# Patient Record
Sex: FEMALE | Race: WHITE | Employment: FULL TIME | URBAN - METROPOLITAN AREA
[De-identification: names, ages, dates, MRNs, and addresses within clinical notes are randomized per-mention and may not be internally consistent; named-entity substitution may affect disease eponyms.]

---

## 2018-05-29 ENCOUNTER — HOSPITAL ENCOUNTER (EMERGENCY)
Age: 38
Discharge: HOME | End: 2018-05-29
Payer: COMMERCIAL

## 2018-05-29 VITALS
BODY MASS INDEX: 34.04 KG/M2 | OXYGEN SATURATION: 96 % | WEIGHT: 185 LBS | SYSTOLIC BLOOD PRESSURE: 143 MMHG | DIASTOLIC BLOOD PRESSURE: 93 MMHG | HEIGHT: 62 IN | TEMPERATURE: 98.6 F | RESPIRATION RATE: 14 BRPM | HEART RATE: 84 BPM

## 2018-05-29 DIAGNOSIS — H01.00B BLEPHARITIS OF BOTH UPPER AND LOWER EYELID OF LEFT EYE, UNSPECIFIED TYPE: Primary | ICD-10-CM

## 2018-05-29 PROCEDURE — 99282 EMERGENCY DEPT VISIT SF MDM: CPT

## 2018-05-29 RX ORDER — POLYMYXIN B SULFATE AND TRIMETHOPRIM 1; 10000 MG/ML; [USP'U]/ML
1 SOLUTION OPHTHALMIC EVERY 4 HOURS
Qty: 1 BOTTLE | Refills: 0 | Status: SHIPPED | OUTPATIENT
Start: 2018-05-29 | End: 2018-06-05

## 2018-05-29 ASSESSMENT — ENCOUNTER SYMPTOMS
NAUSEA: 0
EYE DISCHARGE: 1
ABDOMINAL PAIN: 0
COUGH: 0
VOMITING: 0
EYE REDNESS: 1
SHORTNESS OF BREATH: 0
EYE ITCHING: 1
DIARRHEA: 0

## 2019-12-28 ENCOUNTER — APPOINTMENT (OUTPATIENT)
Dept: GENERAL RADIOLOGY | Age: 39
End: 2019-12-28
Payer: COMMERCIAL

## 2019-12-28 ENCOUNTER — HOSPITAL ENCOUNTER (EMERGENCY)
Age: 39
Discharge: HOME | End: 2019-12-28
Attending: EMERGENCY MEDICINE
Payer: COMMERCIAL

## 2019-12-28 VITALS
DIASTOLIC BLOOD PRESSURE: 77 MMHG | OXYGEN SATURATION: 95 % | SYSTOLIC BLOOD PRESSURE: 141 MMHG | RESPIRATION RATE: 20 BRPM | HEART RATE: 111 BPM | HEIGHT: 62 IN | TEMPERATURE: 99.2 F | WEIGHT: 180 LBS | BODY MASS INDEX: 33.13 KG/M2

## 2019-12-28 DIAGNOSIS — J40 BRONCHITIS: Primary | ICD-10-CM

## 2019-12-28 PROCEDURE — 71046 X-RAY EXAM CHEST 2 VIEWS: CPT

## 2019-12-28 PROCEDURE — 94640 AIRWAY INHALATION TREATMENT: CPT

## 2019-12-28 PROCEDURE — 6360000002 HC RX W HCPCS: Performed by: EMERGENCY MEDICINE

## 2019-12-28 PROCEDURE — 6370000000 HC RX 637 (ALT 250 FOR IP): Performed by: EMERGENCY MEDICINE

## 2019-12-28 PROCEDURE — 94761 N-INVAS EAR/PLS OXIMETRY MLT: CPT

## 2019-12-28 PROCEDURE — 99283 EMERGENCY DEPT VISIT LOW MDM: CPT

## 2019-12-28 RX ORDER — PREDNISONE 20 MG/1
60 TABLET ORAL ONCE
Status: COMPLETED | OUTPATIENT
Start: 2019-12-28 | End: 2019-12-28

## 2019-12-28 RX ORDER — DOXYCYCLINE HYCLATE 100 MG/1
100 CAPSULE ORAL ONCE
Status: COMPLETED | OUTPATIENT
Start: 2019-12-28 | End: 2019-12-28

## 2019-12-28 RX ORDER — DOXYCYCLINE 100 MG/1
100 TABLET ORAL 2 TIMES DAILY
Qty: 14 TABLET | Refills: 0 | Status: SHIPPED | OUTPATIENT
Start: 2019-12-28 | End: 2020-01-04

## 2019-12-28 RX ORDER — PREDNISONE 20 MG/1
40 TABLET ORAL DAILY
Qty: 10 TABLET | Refills: 0 | Status: SHIPPED | OUTPATIENT
Start: 2019-12-28 | End: 2020-01-02

## 2019-12-28 RX ORDER — LEVALBUTEROL 1.25 MG/.5ML
SOLUTION, CONCENTRATE RESPIRATORY (INHALATION)
Status: DISCONTINUED
Start: 2019-12-28 | End: 2019-12-28 | Stop reason: HOSPADM

## 2019-12-28 RX ORDER — IPRATROPIUM BROMIDE AND ALBUTEROL SULFATE 2.5; .5 MG/3ML; MG/3ML
1 SOLUTION RESPIRATORY (INHALATION) PRN
Status: DISCONTINUED | OUTPATIENT
Start: 2019-12-28 | End: 2019-12-28 | Stop reason: HOSPADM

## 2019-12-28 RX ORDER — LEVALBUTEROL 1.25 MG/.5ML
1.25 SOLUTION, CONCENTRATE RESPIRATORY (INHALATION) PRN
Status: DISCONTINUED | OUTPATIENT
Start: 2019-12-28 | End: 2019-12-28 | Stop reason: HOSPADM

## 2019-12-28 RX ADMIN — IPRATROPIUM BROMIDE AND ALBUTEROL SULFATE 1 AMPULE: .5; 3 SOLUTION RESPIRATORY (INHALATION) at 15:14

## 2019-12-28 RX ADMIN — PREDNISONE 60 MG: 20 TABLET ORAL at 16:11

## 2019-12-28 RX ADMIN — LEVALBUTEROL 1.25 MG: 1.25 SOLUTION, CONCENTRATE RESPIRATORY (INHALATION) at 15:37

## 2019-12-28 RX ADMIN — DOXYCYCLINE HYCLATE 100 MG: 100 CAPSULE ORAL at 16:11

## 2019-12-28 RX ADMIN — LEVALBUTEROL 1.25 MG: 1.25 SOLUTION, CONCENTRATE RESPIRATORY (INHALATION) at 15:30

## 2019-12-28 ASSESSMENT — PAIN DESCRIPTION - ORIENTATION: ORIENTATION: RIGHT;LEFT

## 2019-12-28 ASSESSMENT — ENCOUNTER SYMPTOMS
CHEST TIGHTNESS: 1
SORE THROAT: 0
WHEEZING: 1
VOMITING: 0
SHORTNESS OF BREATH: 1
COUGH: 1
ABDOMINAL PAIN: 0
EYE PAIN: 0
NAUSEA: 0

## 2019-12-28 ASSESSMENT — PAIN DESCRIPTION - ONSET: ONSET: ON-GOING

## 2019-12-28 ASSESSMENT — PAIN DESCRIPTION - FREQUENCY: FREQUENCY: INTERMITTENT

## 2019-12-28 ASSESSMENT — PAIN DESCRIPTION - PROGRESSION: CLINICAL_PROGRESSION: GRADUALLY WORSENING

## 2019-12-28 ASSESSMENT — PAIN SCALES - GENERAL: PAINLEVEL_OUTOF10: 10

## 2019-12-28 ASSESSMENT — PAIN DESCRIPTION - DESCRIPTORS: DESCRIPTORS: ACHING

## 2019-12-28 ASSESSMENT — PAIN DESCRIPTION - PAIN TYPE: TYPE: ACUTE PAIN

## 2019-12-28 ASSESSMENT — PAIN DESCRIPTION - LOCATION: LOCATION: THROAT

## 2022-08-09 ENCOUNTER — OFFICE VISIT (OUTPATIENT)
Dept: FAMILY MEDICINE CLINIC | Age: 42
End: 2022-08-09
Payer: COMMERCIAL

## 2022-08-09 VITALS
WEIGHT: 236.6 LBS | HEIGHT: 62 IN | DIASTOLIC BLOOD PRESSURE: 70 MMHG | SYSTOLIC BLOOD PRESSURE: 128 MMHG | HEART RATE: 82 BPM | BODY MASS INDEX: 43.54 KG/M2 | OXYGEN SATURATION: 96 % | TEMPERATURE: 97.6 F

## 2022-08-09 DIAGNOSIS — E78.2 MIXED HYPERLIPIDEMIA: ICD-10-CM

## 2022-08-09 DIAGNOSIS — M25.511 RIGHT SHOULDER PAIN, UNSPECIFIED CHRONICITY: ICD-10-CM

## 2022-08-09 DIAGNOSIS — Z12.31 ENCOUNTER FOR SCREENING MAMMOGRAM FOR MALIGNANT NEOPLASM OF BREAST: ICD-10-CM

## 2022-08-09 DIAGNOSIS — E78.2 MIXED HYPERLIPIDEMIA: Primary | ICD-10-CM

## 2022-08-09 LAB
ALBUMIN SERPL-MCNC: 4.2 G/DL (ref 3.5–4.6)
ALP BLD-CCNC: 58 U/L (ref 40–130)
ALT SERPL-CCNC: 39 U/L (ref 0–33)
ANION GAP SERPL CALCULATED.3IONS-SCNC: 12 MEQ/L (ref 9–15)
AST SERPL-CCNC: 29 U/L (ref 0–35)
BILIRUB SERPL-MCNC: 0.5 MG/DL (ref 0.2–0.7)
BUN BLDV-MCNC: 13 MG/DL (ref 6–20)
CALCIUM SERPL-MCNC: 9.3 MG/DL (ref 8.5–9.9)
CHLORIDE BLD-SCNC: 102 MEQ/L (ref 95–107)
CHOLESTEROL, FASTING: 183 MG/DL (ref 0–199)
CO2: 27 MEQ/L (ref 20–31)
CREAT SERPL-MCNC: 0.72 MG/DL (ref 0.5–0.9)
GFR AFRICAN AMERICAN: >60
GFR NON-AFRICAN AMERICAN: >60
GLOBULIN: 3.5 G/DL (ref 2.3–3.5)
GLUCOSE BLD-MCNC: 104 MG/DL (ref 70–99)
HDLC SERPL-MCNC: 40 MG/DL (ref 40–59)
LDL CHOLESTEROL CALCULATED: 119 MG/DL (ref 0–129)
POTASSIUM SERPL-SCNC: 3.7 MEQ/L (ref 3.4–4.9)
SODIUM BLD-SCNC: 141 MEQ/L (ref 135–144)
TOTAL PROTEIN: 7.7 G/DL (ref 6.3–8)
TRIGLYCERIDE, FASTING: 119 MG/DL (ref 0–150)

## 2022-08-09 PROCEDURE — 99204 OFFICE O/P NEW MOD 45 MIN: CPT | Performed by: FAMILY MEDICINE

## 2022-08-09 RX ORDER — MELOXICAM 15 MG/1
15 TABLET ORAL DAILY PRN
Qty: 30 TABLET | Refills: 3 | Status: SHIPPED | OUTPATIENT
Start: 2022-08-09

## 2022-08-09 SDOH — ECONOMIC STABILITY: FOOD INSECURITY: WITHIN THE PAST 12 MONTHS, THE FOOD YOU BOUGHT JUST DIDN'T LAST AND YOU DIDN'T HAVE MONEY TO GET MORE.: NEVER TRUE

## 2022-08-09 SDOH — ECONOMIC STABILITY: FOOD INSECURITY: WITHIN THE PAST 12 MONTHS, YOU WORRIED THAT YOUR FOOD WOULD RUN OUT BEFORE YOU GOT MONEY TO BUY MORE.: NEVER TRUE

## 2022-08-09 ASSESSMENT — PATIENT HEALTH QUESTIONNAIRE - PHQ9
SUM OF ALL RESPONSES TO PHQ QUESTIONS 1-9: 0
SUM OF ALL RESPONSES TO PHQ9 QUESTIONS 1 & 2: 0
SUM OF ALL RESPONSES TO PHQ QUESTIONS 1-9: 0
1. LITTLE INTEREST OR PLEASURE IN DOING THINGS: 0
2. FEELING DOWN, DEPRESSED OR HOPELESS: 0

## 2022-08-09 ASSESSMENT — SOCIAL DETERMINANTS OF HEALTH (SDOH): HOW HARD IS IT FOR YOU TO PAY FOR THE VERY BASICS LIKE FOOD, HOUSING, MEDICAL CARE, AND HEATING?: NOT HARD AT ALL
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
## 2022-08-17 ENCOUNTER — APPOINTMENT (OUTPATIENT)
Dept: PHYSICAL THERAPY | Age: 42
End: 2022-08-17
Payer: COMMERCIAL

## 2022-08-18 ENCOUNTER — HOSPITAL ENCOUNTER (OUTPATIENT)
Dept: PHYSICAL THERAPY | Age: 42
Setting detail: THERAPIES SERIES
Discharge: HOME | End: 2022-08-18
Payer: COMMERCIAL

## 2022-08-18 PROCEDURE — 97110 THERAPEUTIC EXERCISES: CPT

## 2022-08-18 PROCEDURE — 97161 PT EVAL LOW COMPLEX 20 MIN: CPT

## 2022-08-18 ASSESSMENT — PAIN DESCRIPTION - ORIENTATION: ORIENTATION: RIGHT;OUTER

## 2022-08-18 ASSESSMENT — PAIN DESCRIPTION - DESCRIPTORS: DESCRIPTORS: SHOOTING;SHARP

## 2022-08-18 ASSESSMENT — PAIN DESCRIPTION - PAIN TYPE: TYPE: ACUTE PAIN;CHRONIC PAIN

## 2022-08-18 ASSESSMENT — PAIN DESCRIPTION - LOCATION: LOCATION: SHOULDER

## 2022-08-18 ASSESSMENT — PAIN DESCRIPTION - ONSET: ONSET: ON-GOING

## 2022-08-18 ASSESSMENT — PAIN DESCRIPTION - FREQUENCY: FREQUENCY: CONTINUOUS

## 2022-08-18 ASSESSMENT — PAIN - FUNCTIONAL ASSESSMENT: PAIN_FUNCTIONAL_ASSESSMENT: PREVENTS OR INTERFERES SOME ACTIVE ACTIVITIES AND ADLS

## 2022-08-18 ASSESSMENT — PAIN SCALES - GENERAL: PAINLEVEL_OUTOF10: 6

## 2022-08-18 NOTE — PROGRESS NOTES
using heat/ice with no relief. Patient denies any prior injuries & surgeries to right shoulder. Patient reports PT is her first attempt at shoulder. Patient reports intermittently needing assistance for washing hair, reaching up, & dressing shirt. Patient reports pain is on lateral right shoulder & travels down arm to finger tips intermittently. Additional Pertinent Hx (if applicable): N/A   Prior diagnostic testing[de-identified] X-ray  Any changes to allergies, medications, or have you had any medical procedures/ER visits since your last visit?: No  Comments: RTD = 9/9/22; 9/12/22; Rebecca Roberto right shoulder 2022: No acute osseous abnormality of the right shoulder.       Learning/Language: Learning  Does the patient/guardian have any barriers to learning?: No barriers  Will there be a co-learner?: No  What is the preferred language of the patient/guardian?: English  Is an  required?: No  How does the patient/guardian prefer to learn new concepts?: Reading, Listening, Demonstration     Pain Screening    Pain Screening  Patient Currently in Pain: Yes  Pain Assessment: 0-10  Pain Level: 6  Best Pain Level: 6  Worst Pain Level: 10  Date pain first started: 05/29/22  Patient's Stated Pain Goal: 4  Pain Type: Acute pain, Chronic pain  Pain Location: Shoulder  Pain Orientation: Right, Outer  Pain Descriptors: Shooting, Sharp  Pain Frequency: Continuous  Pain Onset: On-going  Functional Pain Assessment: Prevents or interferes some active activities and ADLs  Pain Management/Relieving Factors: Rest    Functional Status    Social History:    Social History  Lives With: Family  Type of Home: House  Home Layout: Two level, Laundry in basement, Bed/Bath upstairs  Home Access: Stairs to enter with rails  Entrance Stairs - Rails: Left  Entrance Stairs - Number of Steps: 4-5    Occupation/Interests:   Occupation: Full time employment  Type of Occupation: [employer]  Job Duties: Prolonged sitting    Prior Level of Function: Independent        Current Level of Function:   Min A (dressing, washing hair)      ADL Assistance: [address]: Independent  Homemaking Responsibilities: Yes  Ambulation Assistance: Independent  Transfer Assistance: Independent  Active : Yes  Mode of Transportation: Car    Dominant Hand: : Right    OBJECTIVE EXAMINATION     Review of Systems:  Vision: Impaired  Visual Deficits: Wears glasses  Hearing: Within functional limits  Overall Orientation Status: Within Normal Limits  Patient affect[de-identified] Normal  Follows Commands: Within Functional Limits    Observations:   General Observations  General Observations: Yes  Description: forward head/shoulders, tenderness to palpation of right biceps origin & right deltoid insertion & right UT    Left AROM  Right AROM         AROM LUE (degrees)  LUE AROM : WFL  LUE General AROM: full motion all planes    AROM RUE (degrees)  RUE AROM :  (all motions painful)  RUE General AROM: shoulder flexion = 60*; shoulder abduction = 55*; shoulder IR = PSIS; shoulder ER = unable to get UE past mid belly        Left PROM  Right PROM         PROM LUE (degrees)  LUE PROM: WFL    PROM RUE (degrees)  RUE PROM:  (painful all motions)  RUE General PROM: shoulder flexion = 137*; shoulder abduction = 97*; shoulder IR = 70*; shoulder ER = 40*        Left Strength  Right Strength         Strength LUE  Comment: shoulder flexion = 4+/5; shoulder abduction = 4+/5; shoulder IR = 4/5; shoulder ER = 4/5; elbow extension & flexion = 5/5    Strength RUE  Strength RUE:  (painful)  Comment: grossly 3-/5 within available ranges     Outcomes Score:  Exam: UEFI = 32/80     Treatment:    Exercises:   Exercises  Exercise 1: table slides, 1 set x 10 reps x 5 second hold, flexion & scaption, RUE  Exercise 2: wall slides*  Exercise 3: pulleys*  Exercise 4: scapular retraction, 1 set x 10 reps x 5 second hold  Exercise 5: IR stretch*  Exercise 6: ER stretch*  Exercise 7: UBE*  Exercise 8: shoulder isometrics*  Exercise 9: shoulder PROM*  Exercise 10: shoulder AAROM*  Exercise 11: shoulder AROM*  Exercise 20: HEP: table slides & scapular retraction     Modalities:  Modalities:  (CP/HP/e-stim/US* CHECK PRECAUTIONS/CONTRAINDICATIONS)     Manual:  Manual Therapy  Joint Mobilization: right shoulder*  Soft Tissue Mobilizaton: right shoulder*  Other: cupping* KT taping*     *Indicates exercise,modality, or manual techniques to be initiated when appropriate       ASSESSMENT     Impression: Assessment: Patient is a 40year old female who presents with right shoulder pain that began Memorial Day weekend that has not resolved since incident. Patient demonstrates increased right shoulder pain, decreased pain free ROM & strength of right shoulder, decreased ability to perform ADLs with right UE, decreased functional endurance with use of RUE, & impaired postural awareness. Patient would benefit from outpatient PT services in order to address these impairments as well as improve patient's QOL & ease with ADLs. Body Structures, Functions, Activity Limitations Requiring Skilled Therapeutic Intervention: Decreased ADL status, Decreased ROM, Decreased strength, Decreased endurance, Increased pain, Decreased posture    Statement of Medical Necessity: Physical Therapy is both indicated and medically necessary as outlined in the POC to increase the likelihood of meeting the functionally related goals stated below.      Patient's Activity Tolerance: Patient tolerated evaluation without incident, Patient tolerated treatment well      Patient's rehabilitation potential/prognosis is considered to be: Good    Factors which may impact rehabilitation potential include:       Patient Education: Goals, PT Role, Plan of Care, Evaluative findings, Insurance, Home Exercise Program      GOALS   Patient Goal(s): Patient goals : \"less pain\"    Short Term Goals - Time Frame for Short term goals: 2-4 weeks    Goals Current/Discharge status Status   Short term goal 1: The patient will demonstrate improved postural awareness requiring <25% verbal cueing during functional mobility tasks & exercises  On-going, decreased postural awareness New        Long Term Goals Completed by 4-6 weeks Goal Status   The patient will demonstrate competency with HEP to progress towards self management of symptoms upon D/C New   The patient will report decreased right shoulder pain </=3-4/10 consistently in order to improve ability to perform functional mobility tasks New   The patient will improve pain free right shoulder AROM >/= 75% of left shoulder AROM in order to increase ability to perform functional mobility tasks efficiently. New   The patient will demonstrate improved R UE strength >/= 75% of L UE strength in order to lift/carry with decreased pain New   The patient will have an increase in UEFI score >/=9 points in order to increase ability to perform functional mobility tasks & ADLs.  New     TREATMENT PLAN       Requires PT Follow-Up: Yes    Treatment may include any combination of the following: Strengthening, ROM, Pain management, Return to work related activity, Modalities, Home exercise program, Safety education & training, Manual Therapy - Soft Tissue Mobilization, Manual Therapy - Joint Manipulation, Patient/Caregiver education & training, Endurance training, Therapeutic activities, Positioning, Neuromuscular re-education     Frequency / Duration:  Patient to be seen 1-2xs/wk times per week for 4-6 weeks weeks  Plan Comment:               Eval Complexity:   Decision Making: Low Complexity  History: Personal Factors and/or Comorbidities Impacting POC: Low  Examination of body system(s) including body structures and functions, activity limitations, and/or participation restrictions: Medium  Exam: UEFI = 32/80  Clinical Presentation: Low    POST-PAIN     Pain Rating (0-10 pain scale):   \"sore\"/10  Location and pain description same as pre-treatment unless indicated. Action: [] NA  [] Call Physician  [x] Perform HEP  [] Meds as prescribed    Evaluation and patient rights have been reviewed and patient agrees with plan of care. Yes  [x]  No  []   Explain:     Herrera Fall Risk Assessment  Risk Factor Scale  Score   History of Falls [] Yes  [x] No 25  0    Secondary Diagnosis [] Yes  [x] No 15  0    Ambulatory Aid [] Furniture  [] Crutches/cane/walker  [x] None/bedrest/wheelchair/nurse 30  15  0    IV/Heparin Lock [] Yes  [x] No 20  0    Gait/Transferring [] Impaired  [] Weak  [x] Normal/bedrest/immobile 20  10  0    Mental Status [] Forgets limitations  [x] Oriented to own ability 15  0       Total:0     Based on the Assessment score: check the appropriate box.   [x]  No intervention needed   Low =   Score of 0-24  []  Use standard prevention interventions Moderate =  Score of 24-44   [] Discuss fall prevention strategies   [] Indicate moderate falls risk on eval  []  Use high risk prevention interventions High = Score of 45 and higher   [] Discuss fall prevention strategies   [] Provide supervision during treatment time      Minutes:  PT Individual Minutes  Time In: 0750  Time Out: 0830  Minutes: 40  Timed Code Treatment Minutes: 15 Minutes  Procedure Minutes: 25 minutes evaluation     Timed Activity Minutes Units   Ther Ex 15 1     Electronically signed by Rebeca Saeed PT on 8/18/22 at 8:33 AM EDT

## 2022-08-18 NOTE — PROGRESS NOTES
[address]     Ph: [phone]  Fax: [phone]      [x] Certification  [] Recertification [x]  Plan of Care  [] Progress Note [] Discharge      Referring Provider: July Black MD  From:  Kristine Lee, PT , DPT  Patient: Malorie Wylie (89 y.o. female) : 1980 Date: 2022   Medical Diagnosis: Pain in right shoulder [M25.511] Right shoulder pain, unspecified chronicity  Treatment Diagnosis: increased right shoulder pain, decreased pain free ROM & strength of right shoulder, decreased ability to perform ADLs with right UE, decreased functional endurance with use of RUE, & impaired postural awareness    Plan of Care/Certification Expiration Date: :     Progress Report Period from:  2022  to 2022    Visits to Date: 1 No Show: 0 Cancelled Appts: 0    OBJECTIVE:   Short Term Goals - Time Frame for Short term goals: 2-4 weeks    Goals Current/Discharge status  Status   Short term goal 1: The patient will demonstrate improved postural awareness requiring <25% verbal cueing during functional mobility tasks & exercises  On-going, decreased postural awareness New     Long Term Goals - Time Frame for Long term goals : 4-6 weeks  Goals Current/ Discharge status Status   Long term goal 1: The patient will demonstrate competency with HEP to progress towards self management of symptoms upon D/C On-going, initiated this date New   Long term goal 2: The patient will report decreased right shoulder pain </=3-4/10 consistently in order to improve ability to perform functional mobility tasks Pain Screening  Patient Currently in Pain: Yes  Pain Assessment: 0-10  Pain Level: 6  Best Pain Level: 6  Worst Pain Level: 10  Date pain first started: 22  Patient's Stated Pain Goal: 4  Pain Type: Acute pain, Chronic pain  Pain Location: Shoulder  Pain Orientation: Right, Outer  Pain Descriptors: Shooting, Sharp  Pain Frequency: Continuous  Pain Onset: On-going  Functional Pain Assessment: Prevents or interferes some active activities and ADLs  Pain Management/Relieving Factors: Rest New   Long term goal 3: The patient will improve pain free right shoulder AROM >/= 75% of left shoulder AROM in order to increase ability to perform functional mobility tasks efficiently. Strength LUE  Comment: shoulder flexion = 4+/5; shoulder abduction = 4+/5; shoulder IR = 4/5; shoulder ER = 4/5; elbow extension & flexion = 5/5  Strength RUE  Strength RUE:  (painful)  Comment: grossly 3-/5 within available ranges  New   Long term goal 4: The patient will demonstrate improved R UE strength >/= 75% of L UE strength in order to lift/carry with decreased pain Strength LUE  Comment: shoulder flexion = 4+/5; shoulder abduction = 4+/5; shoulder IR = 4/5; shoulder ER = 4/5; elbow extension & flexion = 5/5  Strength RUE  Strength RUE:  (painful)  Comment: grossly 3-/5 within available ranges  New   Long term goal 5: The patient will have an increase in UEFI score >/=9 points in order to increase ability to perform functional mobility tasks & ADLs. Exam: UEFI = 32/80  New     Body Structures, Functions, Activity Limitations Requiring Skilled Therapeutic Intervention: Decreased ADL status, Decreased ROM, Decreased strength, Decreased endurance, Increased pain, Decreased posture  Assessment: Patient is a 40year old female who presents with right shoulder pain that began Memorial Day weekend that has not resolved since incident. Patient demonstrates increased right shoulder pain, decreased pain free ROM & strength of right shoulder, decreased ability to perform ADLs with right UE, decreased functional endurance with use of RUE, & impaired postural awareness. Patient would benefit from outpatient PT services in order to address these impairments as well as improve patient's QOL & ease with ADLs.   Therapy Prognosis: Good    PT Education:

## 2022-08-22 ENCOUNTER — HOSPITAL ENCOUNTER (OUTPATIENT)
Dept: PHYSICAL THERAPY | Age: 42
Setting detail: THERAPIES SERIES
Discharge: HOME | End: 2022-08-22
Payer: COMMERCIAL

## 2022-08-22 PROCEDURE — 97140 MANUAL THERAPY 1/> REGIONS: CPT

## 2022-08-22 PROCEDURE — 97110 THERAPEUTIC EXERCISES: CPT

## 2022-08-22 ASSESSMENT — PAIN DESCRIPTION - LOCATION: LOCATION: SHOULDER

## 2022-08-22 ASSESSMENT — PAIN SCALES - GENERAL: PAINLEVEL_OUTOF10: 7

## 2022-08-22 ASSESSMENT — PAIN DESCRIPTION - ORIENTATION: ORIENTATION: RIGHT

## 2022-08-22 ASSESSMENT — PAIN DESCRIPTION - DESCRIPTORS: DESCRIPTORS: ACHING;SHARP;SORE;DULL

## 2022-08-22 NOTE — PROGRESS NOTES
[facility]  Outpatient Physical Therapy    Treatment Note        Date: 2022  Patient: Jules Plaza  : 1980   Confirmed: Yes  MRN: [id number]  Referring Provider: Edward Mcdaniels MD  Secondary Referring Provider (If applicable):     Medical Diagnosis: Pain in right shoulder [M25.511]    Treatment Diagnosis: increased right shoulder pain, decreased pain free ROM & strength of right shoulder, decreased ability to perform ADLs with right UE, decreased functional endurance with use of RUE, & impaired postural awareness    Visit Information:  Insurance: Payor: Queen Quach / Plan: Queen Quach / Product Type: *No Product type* /   PT Visit Information  Onset Date: 22  Total # of Visits Approved: 90 (PT/OT/SP combined (0 used))  Total # of Visits to Date: 2  No Show: 0  Canceled Appointment: 0  Progress Note Counter: -    Subjective Information:  Subjective: Pt reports7/10 pain \"All of the above\"  HEP Compliance:  [x] Good [] Fair [] Poor [] Reports not doing due to:    Pain Screening  Patient Currently in Pain: Yes  Pain Level: 7  Pain Location: Shoulder  Pain Orientation: Right  Pain Descriptors: Aching, Sharp, Sore, Dull    Treatment:  Exercises:  Exercises  Exercise 1: table slides, 1 set x 10 reps x 5 second hold, flexion & scaption, RUE  Exercise 3: pulleys 2 minutes  Exercise 5: IR stretch 10 sec x 5  Exercise 7: UBE L 1.0 F/R 2 minutes each  Exercise 10: Wand flex supine, Abd standing 5 sec x 10  Exercise 20: HEP: Continue current, Wand flex & Abd, IR stretch.        Manual:   Manual Therapy  Joint Mobilization: right shoulder Grd 2  Soft Tissue Mobilizaton: right shoulder PROM with distraction, STM ant post 10 minutes  Other: KT tape Y & I strips       Modalities:  Cryotherapy (CPT 12381)  Patient Position: Seated  Cryotherapy location: Right, Shoulder  Post treatment skin assessment: Redness - no adverse reaction       *Indicates exercise, modality, or manual techniques to be initiated when appropriate    Objective Measures:                  Strength: [x] NT  [] MMT completed:     ROM: [] NT  [x] ROM measurements:           PROM RUE (degrees)  RUE General PROM: shoulder flexion = 117*; shoulder abduction = 97*; shoulder IR = 70*; shoulder ER = 40*at 60 degrees abd. Assessment: Body Structures, Functions, Activity Limitations Requiring Skilled Therapeutic Intervention: Decreased ADL status, Decreased ROM, Decreased strength, Decreased endurance, Increased pain, Decreased posture  Assessment: Continue to progress current exercises added wand flexion & Abduction and IR stretch with much VC to avoid painful arcs. KT Tape release signed and applied as stated inmanual section, no change in pain. Concluded with CP to decraese inflammation. Treatment Diagnosis: increased right shoulder pain, decreased pain free ROM & strength of right shoulder, decreased ability to perform ADLs with right UE, decreased functional endurance with use of RUE, & impaired postural awareness             Post-Pain Assessment:       Pain Rating (0-10 pain scale):  6 /10   Location and pain description same as pre-treatment unless indicated.    Action: [] NA   [x] Perform HEP  [] Meds as prescribed  [] Modalities as prescribed   [] Call Physician     GOALS   Patient Goal(s): Patient goals : \"less pain\"    Short Term Goals Completed by 2-4 weeks Goal Status   STG 1 The patient will demonstrate improved postural awareness requiring <25% verbal cueing during functional mobility tasks & exercises In progress   STG 2         Long Term Goals Completed by 4-6 weeks Goal Status   LTG 1 The patient will demonstrate competency with HEP to progress towards self management of symptoms upon D/C In progress   LTG 2 The patient will report decreased right shoulder pain </=3-4/10 consistently in order to improve ability to perform functional mobility tasks In progress   LTG 3 The patient will improve pain free right shoulder AROM >/= 75% of left shoulder AROM in order to increase ability to perform functional mobility tasks efficiently. In progress   LTG 4 The patient will demonstrate improved R UE strength >/= 75% of L UE strength in order to lift/carry with decreased pain In progress   LTG 5 The patient will have an increase in UEFI score >/=9 points in order to increase ability to perform functional mobility tasks & ADLs. In progress          Plan:  Frequency/Duration:  Plan  Plan Frequency: 1-2xs/wk  Plan weeks: 4-6 weeks  Current Treatment Recommendations: Strengthening, ROM, Pain management, Return to work related activity, Modalities, Home exercise program, Safety education & training, Manual Therapy - Soft Tissue Mobilization, Manual Therapy - Joint Manipulation, Patient/Caregiver education & training, Endurance training, Therapeutic activities, Positioning, Neuromuscular re-education  Pt to continue current HEP. See objective section for any therapeutic exercise changes, additions or modifications this date.     Therapy Time:      PT Individual Minutes  Time In: 6192  Time Out: 0809  Minutes: 53  Timed Code Treatment Minutes: 42 Minutes  Procedure Minutes:CP 10    Timed Activity Minutes Units   Ther Ex 32 2   Manual  10 1     Electronically signed by Omkar Brito PTA on 8/22/22 at 8:40 AM EDT

## 2022-09-02 ENCOUNTER — HOSPITAL ENCOUNTER (OUTPATIENT)
Dept: PHYSICAL THERAPY | Age: 42
Setting detail: THERAPIES SERIES
Discharge: HOME | End: 2022-09-02
Payer: COMMERCIAL

## 2022-09-02 PROCEDURE — 97140 MANUAL THERAPY 1/> REGIONS: CPT

## 2022-09-02 PROCEDURE — 97110 THERAPEUTIC EXERCISES: CPT

## 2022-09-02 ASSESSMENT — PAIN SCALES - GENERAL: PAINLEVEL_OUTOF10: 7

## 2022-09-02 ASSESSMENT — PAIN DESCRIPTION - LOCATION: LOCATION: SHOULDER

## 2022-09-02 ASSESSMENT — PAIN DESCRIPTION - DESCRIPTORS: DESCRIPTORS: ACHING;SHARP;SORE;DULL

## 2022-09-02 ASSESSMENT — PAIN DESCRIPTION - ORIENTATION: ORIENTATION: RIGHT

## 2022-09-02 NOTE — PROGRESS NOTES
[facility]  Outpatient Physical Therapy    Treatment Note        Date: 2022  Patient: Raquel Matamoros  : 1980   Confirmed: Yes  MRN: [id number]  Referring Provider: Mabel Walsh MD  Secondary Referring Provider (If applicable):     Medical Diagnosis: Pain in right shoulder [M25.511]    Treatment Diagnosis: increased right shoulder pain, decreased pain free ROM & strength of right shoulder, decreased ability to perform ADLs with right UE, decreased functional endurance with use of RUE, & impaired postural awareness    Visit Information:  Insurance: Payor: Carlota Jose Luis / Plan: Carlota Amaya / Product Type: *No Product type* /   PT Visit Information  Onset Date: 22  Total # of Visits Approved: 90 (PT/OT/SP combined (0 used))  Total # of Visits to Date: 3  No Show: 0  Canceled Appointment: 0  Progress Note Counter: 3/4-    Subjective Information:  Subjective: Pt reports7/10 pain \"Marva been doing the exercises\"  HEP Compliance:  [x] Good [] Fair [] Poor [] Reports not doing due to:    Pain Screening  Patient Currently in Pain: Yes  Pain Level: 7  Pain Location: Shoulder  Pain Orientation: Right  Pain Descriptors: Aching, Sharp, Sore, Dull    Treatment:  Exercises:  Exercises  Exercise 1: table slides, 1 set x 10 reps x 5 second hold, flexion & scaption, RUE  Exercise 3: pulleys 4 minutes  Exercise 5: IR stretch 10 sec x 5  Exercise 6: ER stretch 5 sec x 10  Exercise 8: shoulder isometrics 5 sec x 5  Exercise 10: Wand flex supine, Abd standing 5 sec x 10  Exercise 20: HEP: Continue current, + Yeny.        Manual:   Manual Therapy  Joint Mobilization: right shoulder Grd 2  Soft Tissue Mobilizaton: right shoulder PROM with distraction, STM ant post 9 minutes       Modalities:  Cryotherapy (CPT 74879)  Patient Position: Seated  Cryotherapy location: Right, Shoulder  Post treatment skin assessment: Redness - no adverse reaction       *Indicates exercise, modality, or manual techniques to be initiated when appropriate    Objective Measures:      Strength: [x] NT  [] MMT completed:     ROM: [x] NT  [] ROM measurements:       PROM RUE (degrees)  RUE General PROM: shoulder flexion = 78*; shoulder abduction = 82*; shoulder IR = 70*; shoulder ER = 40*at 60 degrees abd. Assessment: Body Structures, Functions, Activity Limitations Requiring Skilled Therapeutic Intervention: Decreased ADL status, Decreased ROM, Decreased strength, Decreased endurance, Increased pain, Decreased posture  Assessment: Pt very guarded with PROM today Decreased right shoulder flexion and abduction. States KT tape \"didn't help at all\". Initiated Isometric with poor tolerance to abduction annd ER causing increased pain. states good compliance with HEP. Concluded with CP to decrease inflammation. Treatment Diagnosis: increased right shoulder pain, decreased pain free ROM & strength of right shoulder, decreased ability to perform ADLs with right UE, decreased functional endurance with use of RUE, & impaired postural awareness             Post-Pain Assessment:       Pain Rating (0-10 pain scale):  8 /10   Location and pain description same as pre-treatment unless indicated.    Action: [] NA   [] Perform HEP  [] Meds as prescribed  [] Modalities as prescribed   [] Call Physician     GOALS   Patient Goal(s): Patient goals : \"less pain\"    Short Term Goals Completed by 2-4 weeks Goal Status   STG 1 The patient will demonstrate improved postural awareness requiring <25% verbal cueing during functional mobility tasks & exercises In progress     Long Term Goals Completed by 4-6 weeks Goal Status   LTG 1 The patient will demonstrate competency with HEP to progress towards self management of symptoms upon D/C In progress   LTG 2 The patient will report decreased right shoulder pain </=3-4/10 consistently in order to improve ability to perform functional mobility tasks In progress   LTG 3 The patient will improve pain free right shoulder AROM >/= 75% of left shoulder AROM in order to increase ability to perform functional mobility tasks efficiently. In progress   LTG 4 The patient will demonstrate improved R UE strength >/= 75% of L UE strength in order to lift/carry with decreased pain In progress   LTG 5 The patient will have an increase in UEFI score >/=9 points in order to increase ability to perform functional mobility tasks & ADLs. In progress          Plan:  Frequency/Duration:  Plan  Plan Frequency: 1-2xs/wk  Plan weeks: 4-6 weeks  Current Treatment Recommendations: Strengthening, ROM, Pain management, Return to work related activity, Modalities, Home exercise program, Safety education & training, Manual Therapy - Soft Tissue Mobilization, Manual Therapy - Joint Manipulation, Patient/Caregiver education & training, Endurance training, Therapeutic activities, Positioning, Neuromuscular re-education  Pt to continue current HEP. See objective section for any therapeutic exercise changes, additions or modifications this date.     Therapy Time:      PT Individual Minutes  Time In: 3197  Time Out: 0805  Minutes: 49  Timed Code Treatment Minutes: 38 Minutes  Procedure Minutes: Cp 10    Timed Activity Minutes Units   Ther Ex 29 2   Manual  9 1     Electronically signed by Susu Saunders PTA on 9/2/22 at 8:13 AM EDT

## 2022-09-09 ENCOUNTER — HOSPITAL ENCOUNTER (OUTPATIENT)
Dept: PHYSICAL THERAPY | Age: 42
Setting detail: THERAPIES SERIES
Discharge: HOME | End: 2022-09-09
Payer: COMMERCIAL

## 2022-09-09 ENCOUNTER — OFFICE VISIT (OUTPATIENT)
Dept: FAMILY MEDICINE CLINIC | Age: 42
End: 2022-09-09
Payer: COMMERCIAL

## 2022-09-09 VITALS
HEIGHT: 62 IN | OXYGEN SATURATION: 98 % | BODY MASS INDEX: 43.27 KG/M2 | DIASTOLIC BLOOD PRESSURE: 64 MMHG | TEMPERATURE: 97.7 F | SYSTOLIC BLOOD PRESSURE: 124 MMHG | HEART RATE: 70 BPM

## 2022-09-09 DIAGNOSIS — L91.8 MULTIPLE ACQUIRED SKIN TAGS: Primary | ICD-10-CM

## 2022-09-09 PROCEDURE — 11200 RMVL SKIN TAGS UP TO&INC 15: CPT | Performed by: FAMILY MEDICINE

## 2022-09-09 PROCEDURE — 11201 RMVL SKIN TAGS EA ADDL 10: CPT | Performed by: FAMILY MEDICINE

## 2022-09-09 PROCEDURE — 97110 THERAPEUTIC EXERCISES: CPT

## 2022-09-09 ASSESSMENT — PAIN DESCRIPTION - DESCRIPTORS: DESCRIPTORS: ACHING

## 2022-09-09 ASSESSMENT — PAIN DESCRIPTION - ORIENTATION: ORIENTATION: RIGHT

## 2022-09-09 ASSESSMENT — PAIN SCALES - GENERAL: PAINLEVEL_OUTOF10: 8

## 2022-09-09 ASSESSMENT — PAIN DESCRIPTION - LOCATION: LOCATION: SHOULDER

## 2022-09-09 NOTE — PROGRESS NOTES
[facility]  Outpatient Physical Therapy    Treatment Note        Date: 2022  Patient: Angela Cotter  : 1980   Confirmed: Yes  MRN: [id number]  Referring Provider: Nimesh Osorio MD    Medical Diagnosis: Pain in right shoulder [M25.511]       Treatment Diagnosis: increased right shoulder pain, decreased pain free ROM & strength of right shoulder, decreased ability to perform ADLs with right UE, decreased functional endurance with use of RUE, & impaired postural awareness    Visit Information:  Insurance: Payor: Leanne Marmolejo / Plan: Leanne Marmolejo / Product Type: *No Product type* /   PT Visit Information  Onset Date: 22  Total # of Visits Approved: 90 (PT/OT/SP combined (0 used))  Total # of Visits to Date: 4  No Show: 0  Canceled Appointment: 0  Progress Note Counter: -    Subjective Information:  Subjective: Pt reports minimal change since eval. Pt reports pain ranging 6-8/10 in last 5 days. HEP Compliance:  [x] Good [] Fair [] Poor [] Reports not doing due to:    Pain Screening  Patient Currently in Pain: Yes  Pain Assessment: 0-10  Pain Level: 8  Pain Location: Shoulder  Pain Orientation: Right  Pain Descriptors: Aching    Treatment:  Exercises:  Exercises  Exercise 1: table slides, 1 set x 10 reps x 5 second hold, flexion & scaption, RUE  Exercise 3: pulleys 4 minutes  Exercise 6: ER stretch at table 5 sec x 10  Exercise 9: shoulder PROM Rt in all planes with pain through all motions x10 min  Exercise 10:  Wand flex supine, Abd standing 5 sec x 10     Modalities:  Cryotherapy (CPT 15125)  Patient Position: Seated  Cryotherapy location: Right, Shoulder  Post treatment skin assessment: Redness - no adverse reaction       *Indicates exercise, modality, or manual techniques to be initiated when appropriate    Objective Measures:     Strength: [] NT  [x] MMT completed:  Strength RUE  Comment: shoulder flexion = 3-/5; shoulder abduction = 3-/5; shoulder IR = 3-/5; shoulder ER = 3-/5; elbow extension & flexion = 3/5  Strength LUE  Comment: shoulder flexion = 4+/5; shoulder abduction = 4+/5; shoulder IR = 4+/5; shoulder ER = 4+/5; elbow extension & flexion = 5/5     ROM: [] NT  [x] ROM measurements:  AROM LUE (degrees)  LUE AROM : WFL   AROM RUE (degrees)  RUE General AROM: shoulder flexion = 82*; shoulder abduction = 53*; shoulder IR = gluteal fold; shoulder ER = unable to get UE past mid belly     Assessment: Body Structures, Functions, Activity Limitations Requiring Skilled Therapeutic Intervention: Decreased ADL status, Decreased ROM, Decreased strength, Decreased endurance, Increased pain, Decreased posture  Assessment: Pt demonstrates improving Rt shoulder flexion AROM by 20 deg vs eval, though with continued significant limitations noted with increased pain in all planes. No change in strength vs eval with pain with all resisted movements. Pt continues to report significant functional limitations. Pt to f/u with ortho on Monday to discuss plan going forward. Treatment Diagnosis: increased right shoulder pain, decreased pain free ROM & strength of right shoulder, decreased ability to perform ADLs with right UE, decreased functional endurance with use of RUE, & impaired postural awareness           Post-Pain Assessment:       Pain Rating (0-10 pain scale):   8/10   Location and pain description same as pre-treatment unless indicated.    Action: [] NA   [x] Perform HEP  [] Meds as prescribed  [] Modalities as prescribed   [] Call Physician     GOALS   Patient Goal(s): Patient goals : \"less pain\"    Short Term Goals Completed by 2-4 weeks Goal Status   STG 1 The patient will demonstrate improved postural awareness requiring <25% verbal cueing during functional mobility tasks & exercises In progress       Long Term Goals Completed by 4-6 weeks Goal Status   LTG 1 The patient will demonstrate competency with HEP to progress towards self management of symptoms upon D/C In progress   LTG 2 The patient will report decreased right shoulder pain </=3-4/10 consistently in order to improve ability to perform functional mobility tasks In progress   LTG 3 The patient will improve pain free right shoulder AROM >/= 75% of left shoulder AROM in order to increase ability to perform functional mobility tasks efficiently. In progress   LTG 4 The patient will demonstrate improved R UE strength >/= 75% of L UE strength in order to lift/carry with decreased pain In progress   LTG 5 The patient will have an increase in UEFI score >/=9 points in order to increase ability to perform functional mobility tasks & ADLs. In progress            Plan:  Frequency/Duration:  Plan  Plan Frequency: 1-2xs/wk  Plan weeks: 4-6 weeks  Current Treatment Recommendations: Strengthening, ROM, Pain management, Return to work related activity, Modalities, Home exercise program, Safety education & training, Manual Therapy - Soft Tissue Mobilization, Manual Therapy - Joint Manipulation, Patient/Caregiver education & training, Endurance training, Therapeutic activities, Positioning, Neuromuscular re-education  Pt to continue current HEP. See objective section for any therapeutic exercise changes, additions or modifications this date.     Therapy Time:      PT Individual Minutes  Time In: 8151  Time Out: 0563  Minutes: 48  Timed Code Treatment Minutes: 38 Minutes  Procedure Minutes: CP x10 min    Timed Activity Minutes Units   Ther Ex 38 3     Electronically signed by Kalee Miller PTA on 9/9/22 at 1:45 PM EDT

## 2022-09-09 NOTE — PROGRESS NOTES
[address]     Ph: [phone]  Fax: [phone]      [] Certification  [] Recertification []  Plan of Care  [x] Progress Note [] Discharge      Referring Provider: Evelyn Meng MD     From:  Gwen Harris, PT, DPT  Patient: Satya Lofton (85 y.o. female) : 1980 Date: 2022  Medical Diagnosis: Pain in right shoulder [M25.511]       Treatment Diagnosis: increased right shoulder pain, decreased pain free ROM & strength of right shoulder, decreased ability to perform ADLs with right UE, decreased functional endurance with use of RUE, & impaired postural awareness    Plan of Care/Certification Expiration Date: :     Progress Report Period from: 2022 to 2022    Visits to Date: 4 No Show: 0 Cancelled Appts: 0    OBJECTIVE:   Short Term Goals - Time Frame for Short term goals: 2-4 weeks    Goals Current/Discharge status  Status   Short term goal 1: The patient will demonstrate improved postural awareness requiring <25% verbal cueing during functional mobility tasks & exercises  Continued rounded shoulder posture. In progress     Long Term Goals - Time Frame for Long term goals : 4-6 weeks  Goals Current/ Discharge status Status   Long term goal 1: The patient will demonstrate competency with HEP to progress towards self management of symptoms upon D/C Pt reports compliance with HEP In progress   Long term goal 2: The patient will report decreased right shoulder pain </=3-4/10 consistently in order to improve ability to perform functional mobility tasks Pt reports pain ranging 6-8/10 in last 5 days. In progress   Long term goal 3: The patient will improve pain free right shoulder AROM >/= 75% of left shoulder AROM in order to increase ability to perform functional mobility tasks efficiently.     AROM RUE (degrees)  RUE General AROM: shoulder flexion = 82*; shoulder abduction = 53*; shoulder IR = gluteal fold; shoulder ER = unable to get UE past mid belly        In progress   Long term goal 4: The patient will demonstrate improved R UE strength >/= 75% of L UE strength in order to lift/carry with decreased pain Strength LUE  Comment: shoulder flexion = 4+/5; shoulder abduction = 4+/5; shoulder IR = 4+/5; shoulder ER = 4+/5; elbow extension & flexion = 5/5  Strength RUE  Comment: shoulder flexion = 3-/5; shoulder abduction = 3-/5; shoulder IR = 3-/5; shoulder ER = 3-/5; elbow extension & flexion = 3/5  In progress   Long term goal 5: The patient will have an increase in UEFI score >/=9 points in order to increase ability to perform functional mobility tasks & ADLs. 17/80 In progress     Body Structures, Functions, Activity Limitations Requiring Skilled Therapeutic Intervention: Decreased ADL status, Decreased ROM, Decreased strength, Decreased endurance, Increased pain, Decreased posture    Assessment: Patient is a 40year old female who presented on 8/18/22 with right shoulder pain that began [address] weekend that has not resolved since incident who has participated in 4 total outpatient PT sessions 8/18/22-9/12/22. PN written this date for RTD. Pt demonstrates improving Rt shoulder flexion AROM by 20 deg vs eval, though with continued significant limitations noted with increased pain in all planes. No change in strength vs eval with pain with all resisted movements. Pt continues to report significant functional limitations. Pt to f/u with ortho on Monday to discuss plan going forward.     PLAN: [x] Evaluate and Treat  Frequency/Duration:  Plan Frequency: 1-2xs/wk  Plan weeks: 4-6 weeks  Current Treatment Recommendations: Strengthening, ROM, Pain management, Return to work related activity, Modalities, Home exercise program, Safety education & training, Manual Therapy - Soft Tissue Mobilization, Manual Therapy - Joint Manipulation, Patient/Caregiver education & training, Endurance training, Therapeutic activities, Positioning, Neuromuscular re-education                     Patient Status:[x] Continue/ Initiate plan of Care    [] Discharge PT. Recommend pt continue with HEP. [] Additional visits requested, Please re-certify for additional visits:    [] Hold         Signature: Objective information by: Electronically signed by Marlee Verma PTA on 9/9/22 at 1:46 PM EDT    Electronically signed by Heidi Maradiaga PT on 9/12/2022 at 7:42 AM       If you have any questions or concerns, please don't hesitate to call. Thank you for your referral.    I have reviewed this plan of care and certify a need for medically necessary rehabilitation services.     Physician Signature:__________________________________________________________  Date:  Please sign and return

## 2022-09-12 ENCOUNTER — OFFICE VISIT (OUTPATIENT)
Dept: ORTHOPEDIC SURGERY | Age: 42
End: 2022-09-12
Payer: COMMERCIAL

## 2022-09-12 VITALS — HEART RATE: 72 BPM | TEMPERATURE: 97.8 F | OXYGEN SATURATION: 99 % | HEIGHT: 62 IN | BODY MASS INDEX: 43.27 KG/M2

## 2022-09-12 DIAGNOSIS — S46.011A TRAUMATIC TEAR OF RIGHT ROTATOR CUFF, UNSPECIFIED TEAR EXTENT, INITIAL ENCOUNTER: ICD-10-CM

## 2022-09-12 DIAGNOSIS — S46.911A SHOULDER STRAIN, RIGHT, INITIAL ENCOUNTER: Primary | ICD-10-CM

## 2022-09-12 PROCEDURE — 99202 OFFICE O/P NEW SF 15 MIN: CPT | Performed by: PHYSICIAN ASSISTANT

## 2022-09-12 ASSESSMENT — ENCOUNTER SYMPTOMS
EYES NEGATIVE: 1
GASTROINTESTINAL NEGATIVE: 1
RESPIRATORY NEGATIVE: 1
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
## 2022-09-12 NOTE — PROGRESS NOTES
Chief Complaint   Patient presents with    Skin Tag     Skin tag removal        HPI: DORIS Lozada 40 y.o. female presenting for     Skin tag removal   Patient is here for skin tag removal   Has multiple skin tags under the breast that is painful and bothersome. Would like them to be removed. Current Outpatient Medications   Medication Sig Dispense Refill    meloxicam (MOBIC) 15 MG tablet Take 1 tablet by mouth daily as needed for Pain (Patient not taking: Reported on 9/12/2022) 30 tablet 3    ibuprofen (ADVIL;MOTRIN) 200 MG tablet Take 200 mg by mouth as needed for Pain       No current facility-administered medications for this visit. ROS  CONSTITUTIONAL: The patient denies fevers, chills, sweats and body ache. HEENT: Denies headache, blurry vision, eye pain, tinnitus, vertigo,  sore throat, neck or thyroid masses. RESPIRATORY: Denies cough, sputum, hemoptysis. CARDIAC: Denies chest pain, pressure, palpitations, Denies lower extremity edema. GASTROINTESTINAL: Denies abdominal pain, constipation, diarrhea, bleeding in the stools,   GENITOURINARY: Denies dysuria, hematuria, nocturia or frequency, urinary incontinence. NEUROLOGIC: Denies headaches, dizziness, syncope, weakness  MUSCULOSKELETAL: negative. ENDOCRINOLOGY: Denies heat or cold intolerance. HEMATOLOGY: Denies easy bleeding or blood transfusion,anemia  DERMATOLOGY:  admits to skin tags   PSYCHIATRY: Denies depression, agitation or anxiety.     Past Medical History:   Diagnosis Date    Gum disease     Kidney stone         Past Surgical History:   Procedure Laterality Date    TUBAL LIGATION          Family History   Problem Relation Age of Onset    Arthritis Mother     Heart Attack Father     Heart Disease Paternal Grandmother     Heart Disease Paternal Aunt     Heart Disease Other         Social History     Socioeconomic History    Marital status:      Spouse name: Not on file    Number of children: Not on file    Years of education: Not on file    Highest education level: Not on file   Occupational History    Not on file   Tobacco Use    Smoking status: Former     Packs/day: 1.00     Types: Cigarettes    Smokeless tobacco: Never   Vaping Use    Vaping Use: Never used   Substance and Sexual Activity    Alcohol use:  Yes     Alcohol/week: 0.0 standard drinks     Comment: socially    Drug use: Yes     Types: Marijuana [place])    Sexual activity: Yes   Other Topics Concern    Not on file   Social History Narrative    Works at [employer]     Has 2 children     Hobbies white water rafting, camping, family          Social Determinants of Health     Financial Resource Strain: Low Risk     Difficulty of Paying Living Expenses: Not hard at all   Food Insecurity: No Food Insecurity    Worried About Running Out of Food in the Last Year: Never true    Ran Out of Food in the Last Year: Never true   Transportation Needs: Not on file   Physical Activity: Not on file   Stress: Not on file   Social Connections: Not on file   Intimate Partner Violence: Not on file   Housing Stability: Not on file        /64   Pulse 70   Temp 97.7 °F (36.5 °C) (Temporal)   Ht 5' 2\" (1.575 m)   SpO2 98%   BMI 43.27 kg/m²        Physical Exam:    General appearance - alert, well appearing, and in no distress  Mental Status - alert, oriented to person, place, and time  Eyes - pupils equal and reactive, extraocular eye movements intact   Ears - bilateral TM's and external ear canals normal   Nose - normal and patent, no erythema, discharge or polyps   Sinuses - Normal paranasal sinuses without tenderness   Throat - mucous membranes moist, pharynx normal without lesions   Neck - supple, no significant adenopathy   Thyroid - thyroid is normal in size without nodules or tenderness    Chest - clear to auscultation, no wheezes, rales or rhonchi, symmetric air entry   Heart - normal rate, regular rhythm, normal S1, S2, no murmurs, rubs, clicks or gallops  Abdomen - soft, nontender, nondistended, no masses or organomegaly   Back exam - full range of motion, no tenderness, palpable spasm or pain on motion   Neurological - alert, oriented, normal speech, no focal findings or movement disorder noted   Musculoskeletal - no joint tenderness, deformity or swelling   Extremities - peripheral pulses normal, no pedal edema, no clubbing or cyanosis   Skin -  multiple flesh colored pedunculated papules under the breast and axilla all on a thin stalk     Labs   No results found for: TSHREFLEX  TSH   Date Value Ref Range Status   03/04/2016 1.530 0.270 - 4.200 uIU/mL Final     Lab Results   Component Value Date     08/09/2022    K 3.7 08/09/2022     08/09/2022    CO2 27 08/09/2022    BUN 13 08/09/2022    CREATININE 0.72 08/09/2022    GLUCOSE 104 (H) 08/09/2022    CALCIUM 9.3 08/09/2022    PROT 7.7 08/09/2022    LABALBU 4.2 08/09/2022    BILITOT 0.5 08/09/2022    ALKPHOS 58 08/09/2022    AST 29 08/09/2022    ALT 39 (H) 08/09/2022    LABGLOM >60.0 08/09/2022    GFRAA >60.0 08/09/2022    GLOB 3.5 08/09/2022         Lab Results   Component Value Date    LABA1C 5.6 03/04/2016     No results found for: EAG  Lab Results   Component Value Date    WBC 11.5 (H) 03/04/2016    HGB 14.7 03/04/2016    HCT 44.5 03/04/2016    MCV 87.1 03/04/2016     03/04/2016       Skin Tag Removal Procedure Note    Pre-operative Diagnosis: Classic skin tags (acrochordon)    Post-operative Diagnosis: Classic skin tags (acrochordon)    Locations:under breast bilaterally. Indications: discomfort, irritation. Anesthesia: Lidocaine 1% without epinephrine without added sodium bicarbonate    Procedure Details   The risks (including bleeding and infection) and benefits of the procedure and Verbal informed consent obtained. Using sterile iris scissors, 20 skin tags were snipped off at their bases after cleansing with Betadine. Bleeding was controlled by pressure. Findings:  Pathognomonic benign lesions  not sent for pathological exam.    Condition:  Stable    Complications:  none. Plan:  1. Instructed to keep the wounds dry and covered for 24-48h and clean thereafter. 2. Warning signs of infection were reviewed. 3. Recommended that the patient use OTC meds as needed for pain. 4. Return as needed. A/P: [facility] 40 y.o. female presenting for     1. Multiple acquired skin tags  Tolerated the procedure. Gave patient wound care instructions. 20 skin tags removed without difficulty. - 42233 - [place] REMOVAL OF SKIN TAGS, UP TO 15  - [place] REMOVAL OF SKIN TAGS, EACH ADD 10          Please note, this report has been partially produced using speech recognition software  and may cause  and /or contain errors related to that system including grammar, punctuation and spelling as well as words and phrases that may seem inappropriate. If there are questions or concerns please feel free to contact me to clarify.

## 2022-10-05 NOTE — PROGRESS NOTES
[facility], [address]                                  Ph: [phone]  Fax: [phone]        [] Certification  [] Recertification     []  Plan of Care  [] Progress Note [x] Discharge                            Referring Provider: Reji Hdz MD         From:  Ross Jackson, PT, DPT  Patient: Orlando Hough (13 y.o. female) : 1980 Date: 10/5/2022  Medical Diagnosis: Pain in right shoulder [M25.511]       Treatment Diagnosis: increased right shoulder pain, decreased pain free ROM & strength of right shoulder, decreased ability to perform ADLs with right UE, decreased functional endurance with use of RUE, & impaired postural awareness     Plan of Care/Certification Expiration Date: :     Progress Report Period from: 2022 to 10/5/2022     Visits to Date: 4 No Show: 0 Cancelled Appts: 0     OBJECTIVE:   Short Term Goals - Time Frame for Short term goals: 2-4 weeks    Goals Current/Discharge status  Status   Short term goal 1: The patient will demonstrate improved postural awareness requiring <25% verbal cueing during functional mobility tasks & exercises  Continued rounded shoulder posture. As of 22      Long Term Goals - Time Frame for Long term goals : 4-6 weeks  Goals Current/ Discharge status Status   Long term goal 1: The patient will demonstrate competency with HEP to progress towards self management of symptoms upon D/C Pt reports compliance with HEP As of 22   Long term goal 2: The patient will report decreased right shoulder pain </=3-4/10 consistently in order to improve ability to perform functional mobility tasks Pt reports pain ranging 6-8/10 in last 5 days. As of 22   Long term goal 3: The patient will improve pain free right shoulder AROM >/= 75% of left shoulder AROM in order to increase ability to perform functional mobility tasks efficiently.     AROM RUE (degrees)  RUE General AROM: shoulder flexion = 82*; shoulder abduction = 53*; shoulder IR = gluteal fold; shoulder ER = unable to get UE past mid belly           As of 9/9/22   Long term goal 4: The patient will demonstrate improved R UE strength >/= 75% of L UE strength in order to lift/carry with decreased pain Strength LUE  Comment: shoulder flexion = 4+/5; shoulder abduction = 4+/5; shoulder IR = 4+/5; shoulder ER = 4+/5; elbow extension & flexion = 5/5  Strength RUE  Comment: shoulder flexion = 3-/5; shoulder abduction = 3-/5; shoulder IR = 3-/5; shoulder ER = 3-/5; elbow extension & flexion = 3/5  As of 9/9/22   Long term goal 5: The patient will have an increase in UEFI score >/=9 points in order to increase ability to perform functional mobility tasks & ADLs. 17/80 As of 9/9/22      Assessment: Patient is a 40year old female who presented on 8/18/22 with right shoulder pain that began Memorial Day weekend that had not resolved since incident who has participated in 4 total outpatient PT sessions 8/18/22-10/5/22. PN written at last visit on 9/9/22 for RTD. Patient did not return to outpatient PT services following MD appointment. Patient discharged from outpatient PT services secondary to PT clinic's attendance policy. All measurements in chart above taken from last PN. Patient will require new MD order if she wishes to return to outpatient PT services in the future. PLAN:                                         Patient Status:[] Continue/ Initiate plan of Care                          [x] Discharge PT. Recommend pt continue with HEP.                            [] Additional visits requested, Please re-certify for additional visits:                          [] Hold                                                     Signature: Objective information by: Electronically signed by Luna Bucio PTA on 9/9/22 at 1:46 PM EDT     Electronically signed by Gibran Monreal PT on 10/5/2022 at 3:41 PM

## 2023-05-18 ENCOUNTER — APPOINTMENT (OUTPATIENT)
Dept: GENERAL RADIOLOGY | Age: 43
End: 2023-05-18
Payer: COMMERCIAL

## 2023-05-18 ENCOUNTER — HOSPITAL ENCOUNTER (EMERGENCY)
Age: 43
Discharge: HOME | End: 2023-05-18
Payer: COMMERCIAL

## 2023-05-18 VITALS
TEMPERATURE: 98.2 F | HEIGHT: 62 IN | WEIGHT: 205 LBS | OXYGEN SATURATION: 98 % | HEART RATE: 88 BPM | RESPIRATION RATE: 16 BRPM | DIASTOLIC BLOOD PRESSURE: 99 MMHG | SYSTOLIC BLOOD PRESSURE: 189 MMHG | BODY MASS INDEX: 37.73 KG/M2

## 2023-05-18 DIAGNOSIS — S83.411A SPRAIN OF MEDIAL COLLATERAL LIGAMENT OF RIGHT KNEE, INITIAL ENCOUNTER: Primary | ICD-10-CM

## 2023-05-18 PROCEDURE — 73560 X-RAY EXAM OF KNEE 1 OR 2: CPT

## 2023-05-18 PROCEDURE — 99284 EMERGENCY DEPT VISIT MOD MDM: CPT

## 2023-05-18 PROCEDURE — 96372 THER/PROPH/DIAG INJ SC/IM: CPT

## 2023-05-18 PROCEDURE — 6360000002 HC RX W HCPCS: Performed by: PHYSICIAN ASSISTANT

## 2023-05-18 RX ORDER — KETOROLAC TROMETHAMINE 30 MG/ML
60 INJECTION, SOLUTION INTRAMUSCULAR; INTRAVENOUS ONCE
Status: COMPLETED | OUTPATIENT
Start: 2023-05-18 | End: 2023-05-18

## 2023-05-18 RX ORDER — ETODOLAC 400 MG/1
400 TABLET, FILM COATED ORAL 2 TIMES DAILY
Qty: 14 TABLET | Refills: 0 | Status: SHIPPED | OUTPATIENT
Start: 2023-05-18

## 2023-05-18 RX ORDER — METHYLPREDNISOLONE 4 MG/1
TABLET ORAL
Qty: 21 TABLET | Refills: 0 | Status: SHIPPED | OUTPATIENT
Start: 2023-05-18 | End: 2023-05-24

## 2023-05-18 RX ADMIN — KETOROLAC TROMETHAMINE 60 MG: 30 INJECTION, SOLUTION INTRAMUSCULAR at 19:34

## 2023-05-18 ASSESSMENT — PAIN DESCRIPTION - FREQUENCY: FREQUENCY: CONTINUOUS

## 2023-05-18 ASSESSMENT — PAIN DESCRIPTION - ORIENTATION
ORIENTATION: RIGHT
ORIENTATION: RIGHT

## 2023-05-18 ASSESSMENT — PAIN DESCRIPTION - LOCATION
LOCATION: KNEE
LOCATION: KNEE

## 2023-05-18 ASSESSMENT — ENCOUNTER SYMPTOMS
BACK PAIN: 0
COLOR CHANGE: 0
ALLERGIC/IMMUNOLOGIC NEGATIVE: 1
SHORTNESS OF BREATH: 0

## 2023-05-18 ASSESSMENT — PAIN DESCRIPTION - DESCRIPTORS: DESCRIPTORS: SHARP

## 2023-05-18 ASSESSMENT — PAIN DESCRIPTION - PAIN TYPE: TYPE: ACUTE PAIN

## 2023-05-18 ASSESSMENT — PAIN - FUNCTIONAL ASSESSMENT: PAIN_FUNCTIONAL_ASSESSMENT: 0-10

## 2023-05-18 ASSESSMENT — PAIN SCALES - GENERAL
PAINLEVEL_OUTOF10: 10
PAINLEVEL_OUTOF10: 10

## 2023-05-18 NOTE — ED TRIAGE NOTES
Pt c/o rt knee pain and unable to bend it since yesterday, pt states she stepped on the chair with lifting left leg first whne knee got locked. Pt a&ox4, skin w/d/pink, 0 distress, slight swelling noted to rt knee. Pulses palp.

## 2023-05-18 NOTE — ED PROVIDER NOTES
(93 kg)    Height: 5' 2\" (1.575 m)        REASSESSMENT        Patient presented the emergency department with complaints of right knee pain. Differential diagnosis included a meniscus tear versus ligamentous injury versus occult fracture. X-rays show no obvious fractures. Patient will be placed in a knee immobilizer and crutches treated with steroids and anti-inflammatories and referred to orthopedics for follow-up    Medical Decision Making     Coding     PROCEDURES:    Procedures      FINAL IMPRESSION      1. Sprain of medial collateral ligament of right knee, initial encounter          DISPOSITION/PLAN   DISPOSITION Discharge - Pending Orders Complete 05/18/2023 06:39:33 PM      PATIENT REFERRED TO:  Eryn Young, [address] [place] [address]  [phone]    Call in 2 days      Linda Abrams, [facility] [address] [place] [address]  [phone]    Call in 2 days        DISCHARGE MEDICATIONS:  New Prescriptions    ETODOLAC (LODINE) 400 MG TABLET    Take 1 tablet by mouth 2 times daily    METHYLPREDNISOLONE (MEDROL, DONNA,) 4 MG TABLET    Take by mouth.        (Please note that portions of this note were completed with a voice recognition program.  Efforts were made to edit the dictations but occasionally words are mis-transcribed.)    TRAVIS Chappell PA-C  05/18/23 [id number]

## 2023-05-19 NOTE — ED NOTES
Knee immobilizer placed to Right leg, Pt educated about reapplication. Verbalized understanding to need to keep straps tight enough to remain in place but not too tight ot restrict blood flow to lower leg. Pt demonstrated appropriate use of crutches.       Fritz Linares RN  05/18/23 2007

## 2024-03-18 SDOH — ECONOMIC STABILITY: FOOD INSECURITY: WITHIN THE PAST 12 MONTHS, YOU WORRIED THAT YOUR FOOD WOULD RUN OUT BEFORE YOU GOT MONEY TO BUY MORE.: OFTEN TRUE

## 2024-03-18 SDOH — ECONOMIC STABILITY: INCOME INSECURITY: HOW HARD IS IT FOR YOU TO PAY FOR THE VERY BASICS LIKE FOOD, HOUSING, MEDICAL CARE, AND HEATING?: SOMEWHAT HARD

## 2024-03-18 SDOH — ECONOMIC STABILITY: TRANSPORTATION INSECURITY
IN THE PAST 12 MONTHS, HAS LACK OF TRANSPORTATION KEPT YOU FROM MEETINGS, WORK, OR FROM GETTING THINGS NEEDED FOR DAILY LIVING?: NO

## 2024-03-18 SDOH — ECONOMIC STABILITY: HOUSING INSECURITY
IN THE LAST 12 MONTHS, WAS THERE A TIME WHEN YOU DID NOT HAVE A STEADY PLACE TO SLEEP OR SLEPT IN A SHELTER (INCLUDING NOW)?: NO

## 2024-03-18 SDOH — ECONOMIC STABILITY: FOOD INSECURITY: WITHIN THE PAST 12 MONTHS, THE FOOD YOU BOUGHT JUST DIDN'T LAST AND YOU DIDN'T HAVE MONEY TO GET MORE.: SOMETIMES TRUE

## 2024-03-18 ASSESSMENT — PATIENT HEALTH QUESTIONNAIRE - PHQ9
SUM OF ALL RESPONSES TO PHQ QUESTIONS 1-9: 2
1. LITTLE INTEREST OR PLEASURE IN DOING THINGS: SEVERAL DAYS
SUM OF ALL RESPONSES TO PHQ QUESTIONS 1-9: 2
1. LITTLE INTEREST OR PLEASURE IN DOING THINGS: SEVERAL DAYS
2. FEELING DOWN, DEPRESSED OR HOPELESS: SEVERAL DAYS
SUM OF ALL RESPONSES TO PHQ9 QUESTIONS 1 & 2: 2
SUM OF ALL RESPONSES TO PHQ QUESTIONS 1-9: 2
SUM OF ALL RESPONSES TO PHQ9 QUESTIONS 1 & 2: 2
SUM OF ALL RESPONSES TO PHQ QUESTIONS 1-9: 2
2. FEELING DOWN, DEPRESSED OR HOPELESS: SEVERAL DAYS

## 2024-03-21 ENCOUNTER — OFFICE VISIT (OUTPATIENT)
Dept: FAMILY MEDICINE CLINIC | Age: 44
End: 2024-03-21

## 2024-03-21 VITALS
SYSTOLIC BLOOD PRESSURE: 138 MMHG | OXYGEN SATURATION: 97 % | HEART RATE: 89 BPM | HEIGHT: 62 IN | BODY MASS INDEX: 37.76 KG/M2 | TEMPERATURE: 97 F | DIASTOLIC BLOOD PRESSURE: 72 MMHG | WEIGHT: 205.2 LBS

## 2024-03-21 DIAGNOSIS — L91.8 MULTIPLE ACQUIRED SKIN TAGS: ICD-10-CM

## 2024-03-21 DIAGNOSIS — E78.5 HYPERLIPIDEMIA, UNSPECIFIED HYPERLIPIDEMIA TYPE: ICD-10-CM

## 2024-03-21 DIAGNOSIS — E55.9 VITAMIN D DEFICIENCY: ICD-10-CM

## 2024-03-21 DIAGNOSIS — N92.6 IRREGULAR MENSTRUAL CYCLE: Primary | ICD-10-CM

## 2024-03-21 DIAGNOSIS — Z12.31 ENCOUNTER FOR SCREENING MAMMOGRAM FOR MALIGNANT NEOPLASM OF BREAST: ICD-10-CM

## 2024-03-21 LAB
CHOLEST SERPL-MCNC: 194 MG/DL (ref 0–199)
HDLC SERPL-MCNC: 40 MG/DL (ref 40–59)
LDL CHOLESTEROL CALCULATED: 120 MG/DL (ref 0–129)
TRIGLYCERIDE, FASTING: 168 MG/DL (ref 0–150)

## 2024-03-21 NOTE — PROGRESS NOTES
Examination chaperoned by Hermelinda Ocampo MA.  
08/09/2022    GFRAA >60.0 08/09/2022    GLOB 3.5 08/09/2022         Lab Results   Component Value Date    LABA1C 5.6 03/04/2016     No results found for: \"EAG\"  Lab Results   Component Value Date    WBC 11.5 (H) 03/04/2016    HGB 14.7 03/04/2016    HCT 44.5 03/04/2016    MCV 87.1 03/04/2016     03/04/2016       Skin Tag Removal Procedure Note    Pre-operative Diagnosis: Classic skin tags (acrochordon)    Post-operative Diagnosis: Classic skin tags (acrochordon)    Locations:axillas, abdomen, back, and groin area bilaterally.     Indications: discomfort, irritation.     Anesthesia: Lidocaine 1% without epinephrine without added sodium bicarbonate    Procedure Details   The risks (including bleeding and infection) and benefits of the procedure and Verbal informed consent obtained. Using sterile iris scissors, 20 skin tags were snipped off at their bases after cleansing with Betadine.  Bleeding was controlled by pressure.     Findings:  Pathognomonic benign lesions  not sent for pathological exam.    Condition:  Stable    Complications:  none.    Plan:  1. Instructed to keep the wounds dry and covered for 24-48h and clean thereafter.  2. Warning signs of infection were reviewed.    3. Recommended that the patient use OTC meds as needed for pain.   4. Return as needed.      A/P: Debbie Lozada 44 y.o. female presenting for       1. Irregular menstrual cycle  Obtain ultrasound to rule out fibroids   Does not want medication at this time for periemopausal hot flashes   - US PELVIS COMPLETE; Future  - US NON OB TRANSVAGINAL; Future    2. Vitamin D deficiency    - Vitamin D 25 Hydroxy; Future    3. Hyperlipidemia, unspecified hyperlipidemia type    - Lipid, Fasting; Future    4. Encounter for screening mammogram for malignant neoplasm of breast    - INDIO DIGITAL SCREEN W OR WO CAD BILATERAL; Future    5. Multiple acquired skin tags  Removed without complications  - 54427 - [place] REMOVAL OF SKIN TAGS, UP TO 15  -

## 2024-03-22 LAB — VITAMIN D 25-HYDROXY: 15.4 NG/ML (ref 30–100)

## 2024-06-26 ENCOUNTER — HOSPITAL ENCOUNTER (EMERGENCY)
Facility: HOSPITAL | Age: 44
Discharge: HOME | End: 2024-06-26
Attending: EMERGENCY MEDICINE
Payer: COMMERCIAL

## 2024-06-26 ENCOUNTER — APPOINTMENT (OUTPATIENT)
Dept: CARDIOLOGY | Facility: HOSPITAL | Age: 44
End: 2024-06-26
Payer: COMMERCIAL

## 2024-06-26 ENCOUNTER — APPOINTMENT (OUTPATIENT)
Dept: RADIOLOGY | Facility: HOSPITAL | Age: 44
End: 2024-06-26
Payer: COMMERCIAL

## 2024-06-26 VITALS
OXYGEN SATURATION: 98 % | HEIGHT: 62 IN | DIASTOLIC BLOOD PRESSURE: 93 MMHG | WEIGHT: 194 LBS | RESPIRATION RATE: 16 BRPM | SYSTOLIC BLOOD PRESSURE: 164 MMHG | TEMPERATURE: 97.7 F | HEART RATE: 68 BPM | BODY MASS INDEX: 35.7 KG/M2

## 2024-06-26 DIAGNOSIS — G51.0 BELL'S PALSY: Primary | ICD-10-CM

## 2024-06-26 LAB
ALBUMIN SERPL BCP-MCNC: 4.1 G/DL (ref 3.4–5)
ALP SERPL-CCNC: 43 U/L (ref 33–110)
ALT SERPL W P-5'-P-CCNC: 17 U/L (ref 7–45)
ANION GAP SERPL CALC-SCNC: 10 MMOL/L (ref 10–20)
AST SERPL W P-5'-P-CCNC: 18 U/L (ref 9–39)
B-HCG SERPL-ACNC: <2 MIU/ML
BASOPHILS # BLD AUTO: 0.07 X10*3/UL (ref 0–0.1)
BASOPHILS NFR BLD AUTO: 0.9 %
BILIRUB SERPL-MCNC: 0.4 MG/DL (ref 0–1.2)
BUN SERPL-MCNC: 20 MG/DL (ref 6–23)
CALCIUM SERPL-MCNC: 9.4 MG/DL (ref 8.6–10.3)
CARDIAC TROPONIN I PNL SERPL HS: 7 NG/L (ref 0–13)
CHLORIDE SERPL-SCNC: 103 MMOL/L (ref 98–107)
CO2 SERPL-SCNC: 29 MMOL/L (ref 21–32)
CREAT SERPL-MCNC: 0.87 MG/DL (ref 0.5–1.05)
EGFRCR SERPLBLD CKD-EPI 2021: 85 ML/MIN/1.73M*2
EOSINOPHIL # BLD AUTO: 0.14 X10*3/UL (ref 0–0.7)
EOSINOPHIL NFR BLD AUTO: 1.7 %
ERYTHROCYTE [DISTWIDTH] IN BLOOD BY AUTOMATED COUNT: 14.5 % (ref 11.5–14.5)
GLUCOSE BLD MANUAL STRIP-MCNC: 114 MG/DL (ref 74–99)
GLUCOSE SERPL-MCNC: 111 MG/DL (ref 74–99)
HCT VFR BLD AUTO: 37.2 % (ref 36–46)
HGB BLD-MCNC: 12 G/DL (ref 12–16)
HOLD SPECIMEN: NORMAL
IMM GRANULOCYTES # BLD AUTO: 0.02 X10*3/UL (ref 0–0.7)
IMM GRANULOCYTES NFR BLD AUTO: 0.2 % (ref 0–0.9)
LYMPHOCYTES # BLD AUTO: 2.71 X10*3/UL (ref 1.2–4.8)
LYMPHOCYTES NFR BLD AUTO: 33.8 %
MAGNESIUM SERPL-MCNC: 1.92 MG/DL (ref 1.6–2.4)
MCH RBC QN AUTO: 26.8 PG (ref 26–34)
MCHC RBC AUTO-ENTMCNC: 32.3 G/DL (ref 32–36)
MCV RBC AUTO: 83 FL (ref 80–100)
MONOCYTES # BLD AUTO: 0.54 X10*3/UL (ref 0.1–1)
MONOCYTES NFR BLD AUTO: 6.7 %
NEUTROPHILS # BLD AUTO: 4.54 X10*3/UL (ref 1.2–7.7)
NEUTROPHILS NFR BLD AUTO: 56.7 %
NRBC BLD-RTO: 0 /100 WBCS (ref 0–0)
PLATELET # BLD AUTO: 215 X10*3/UL (ref 150–450)
POTASSIUM SERPL-SCNC: 3.8 MMOL/L (ref 3.5–5.3)
PROT SERPL-MCNC: 7.2 G/DL (ref 6.4–8.2)
RBC # BLD AUTO: 4.48 X10*6/UL (ref 4–5.2)
SODIUM SERPL-SCNC: 138 MMOL/L (ref 136–145)
WBC # BLD AUTO: 8 X10*3/UL (ref 4.4–11.3)

## 2024-06-26 PROCEDURE — 83735 ASSAY OF MAGNESIUM: CPT | Performed by: EMERGENCY MEDICINE

## 2024-06-26 PROCEDURE — 82947 ASSAY GLUCOSE BLOOD QUANT: CPT

## 2024-06-26 PROCEDURE — 84484 ASSAY OF TROPONIN QUANT: CPT | Performed by: EMERGENCY MEDICINE

## 2024-06-26 PROCEDURE — 80053 COMPREHEN METABOLIC PANEL: CPT | Performed by: EMERGENCY MEDICINE

## 2024-06-26 PROCEDURE — 99285 EMERGENCY DEPT VISIT HI MDM: CPT | Mod: 25 | Performed by: EMERGENCY MEDICINE

## 2024-06-26 PROCEDURE — 70450 CT HEAD/BRAIN W/O DYE: CPT | Performed by: RADIOLOGY

## 2024-06-26 PROCEDURE — 85025 COMPLETE CBC W/AUTO DIFF WBC: CPT | Performed by: EMERGENCY MEDICINE

## 2024-06-26 PROCEDURE — 2500000002 HC RX 250 W HCPCS SELF ADMINISTERED DRUGS (ALT 637 FOR MEDICARE OP, ALT 636 FOR OP/ED): Performed by: EMERGENCY MEDICINE

## 2024-06-26 PROCEDURE — 96360 HYDRATION IV INFUSION INIT: CPT | Performed by: EMERGENCY MEDICINE

## 2024-06-26 PROCEDURE — 36415 COLL VENOUS BLD VENIPUNCTURE: CPT | Performed by: EMERGENCY MEDICINE

## 2024-06-26 PROCEDURE — 70450 CT HEAD/BRAIN W/O DYE: CPT

## 2024-06-26 PROCEDURE — 84702 CHORIONIC GONADOTROPIN TEST: CPT | Performed by: EMERGENCY MEDICINE

## 2024-06-26 PROCEDURE — 2500000004 HC RX 250 GENERAL PHARMACY W/ HCPCS (ALT 636 FOR OP/ED): Performed by: EMERGENCY MEDICINE

## 2024-06-26 PROCEDURE — 93005 ELECTROCARDIOGRAM TRACING: CPT

## 2024-06-26 RX ORDER — VALACYCLOVIR HYDROCHLORIDE 500 MG/1
1000 TABLET, FILM COATED ORAL ONCE
Status: COMPLETED | OUTPATIENT
Start: 2024-06-26 | End: 2024-06-26

## 2024-06-26 RX ORDER — PREDNISONE 20 MG/1
60 TABLET ORAL DAILY
Qty: 18 TABLET | Refills: 0 | Status: SHIPPED | OUTPATIENT
Start: 2024-06-26 | End: 2024-07-02

## 2024-06-26 RX ORDER — PREDNISONE 20 MG/1
60 TABLET ORAL ONCE
Status: COMPLETED | OUTPATIENT
Start: 2024-06-26 | End: 2024-06-26

## 2024-06-26 RX ORDER — VALACYCLOVIR HYDROCHLORIDE 1 G/1
1000 TABLET, FILM COATED ORAL 3 TIMES DAILY
Qty: 21 TABLET | Refills: 0 | Status: SHIPPED | OUTPATIENT
Start: 2024-06-26 | End: 2024-07-03

## 2024-06-26 RX ORDER — ERYTHROMYCIN 5 MG/G
OINTMENT OPHTHALMIC NIGHTLY
Qty: 1 G | Refills: 0 | Status: SHIPPED | OUTPATIENT
Start: 2024-06-26 | End: 2024-07-06

## 2024-06-26 ASSESSMENT — PAIN DESCRIPTION - PAIN TYPE: TYPE: ACUTE PAIN

## 2024-06-26 ASSESSMENT — PAIN DESCRIPTION - DESCRIPTORS: DESCRIPTORS: SHOOTING

## 2024-06-26 ASSESSMENT — PAIN SCALES - GENERAL: PAINLEVEL_OUTOF10: 3

## 2024-06-26 ASSESSMENT — COLUMBIA-SUICIDE SEVERITY RATING SCALE - C-SSRS
1. IN THE PAST MONTH, HAVE YOU WISHED YOU WERE DEAD OR WISHED YOU COULD GO TO SLEEP AND NOT WAKE UP?: NO
6. HAVE YOU EVER DONE ANYTHING, STARTED TO DO ANYTHING, OR PREPARED TO DO ANYTHING TO END YOUR LIFE?: NO
2. HAVE YOU ACTUALLY HAD ANY THOUGHTS OF KILLING YOURSELF?: NO

## 2024-06-26 ASSESSMENT — PAIN DESCRIPTION - LOCATION: LOCATION: HEAD

## 2024-06-26 ASSESSMENT — PAIN DESCRIPTION - ORIENTATION: ORIENTATION: RIGHT

## 2024-06-26 ASSESSMENT — PAIN DESCRIPTION - FREQUENCY: FREQUENCY: INTERMITTENT

## 2024-06-26 ASSESSMENT — PAIN - FUNCTIONAL ASSESSMENT: PAIN_FUNCTIONAL_ASSESSMENT: 0-10

## 2024-06-26 NOTE — ED PROVIDER NOTES
44-year-old female presents emergency department with chief complaint of right-sided facial droop.  Patient reports she noticed the symptoms yesterday afternoon between 2 PM and 4 PM.  Patient states that she woke up around 6 AM and does not believe that she had the symptoms then, but she admits she is not positive when her last known normal was as she lives alone.  She denies any other focal neurologic deficits.  No reported fevers, coughing, or congestion.  Patient denies any ear pain or sore throat.  She does report that she has had some watering of her right eye due to being unable to close it.  No injury to the eye.  Patient denies any chest pain or difficulty breathing.  No abdominal pain, dysuria, diarrhea, constipation, black or bloody stools.  Patient does report she had a headache yesterday that caused her to vomit.  She states this headache felt like a normal headache to her.  She denies any current headache today and states she is feeling improved.  No reported head injury or trauma. Patient denies any significant past medical history.  States she does not take any medications regularly.  Reports remote history of tobacco use no illicit drug use or regular alcohol use.  Patient does admit to occasional marijuana use.      History provided by:  Patient   used: No           ------------------------------------------------------------------------------------------------------------------------------------------    VS: As documented in the triage note and EMR flowsheet from this visit were reviewed.    Review of Systems  Constitutional: no fever, chills  Eyes: no redness, discharge, pain reports right eye watering  HENT: no sore throat, nose bleeds, congestion, rhinorrhea   Cardiovascular: no chest pain, leg edema, palpitations  Respiratory: no shortness of breath, cough, wheezing  GI: no nausea, diarrhea, pain, vomiting, constipation, BRBPR, melena  : no dysuria, frequency,  hematuria  Musculoskeletal: no neck pain, stiffness,  no joint deformity, swelling  Skin: no rash, erythema, wounds  Neurological: no dizziness, lightheadedness, weakness, admits to headache yesterday none currently.  Reports right-sided facial droop numbness and tingling   psychiatric: no suicidal thoughts, confusion, agitation  Metabolic: no polyuria or polydipsia  Hematologic: no increased bleeding or bruising  Immunology: No immunocompromise state    Critical access hospital  Nursing notes reviewed and confirmed by me.  Chart review performed including medications, allergies, and medical, surgical, and family history  Visit Vitals  BP (!) 164/93   Pulse 68   Temp 36.5 °C (97.7 °F) (Temporal)   Resp 16     Physical Exam  Vitals and nursing note reviewed.   Constitutional:       General: She is not in acute distress.     Appearance: Normal appearance. She is not ill-appearing.   HENT:      Head: Normocephalic and atraumatic.      Right Ear: External ear normal.      Left Ear: External ear normal.      Nose: Nose normal. No congestion or rhinorrhea.      Mouth/Throat:      Mouth: Mucous membranes are moist.      Pharynx: No oropharyngeal exudate or posterior oropharyngeal erythema.   Eyes:      Extraocular Movements: Extraocular movements intact.      Conjunctiva/sclera: Conjunctivae normal.      Pupils: Pupils are equal, round, and reactive to light.   Cardiovascular:      Rate and Rhythm: Normal rate and regular rhythm.      Pulses: Normal pulses.      Heart sounds: Normal heart sounds.   Pulmonary:      Effort: Pulmonary effort is normal. No respiratory distress.      Breath sounds: Normal breath sounds. No stridor. No wheezing, rhonchi or rales.   Abdominal:      General: There is no distension.      Palpations: Abdomen is soft.      Tenderness: There is no abdominal tenderness. There is no guarding or rebound.   Musculoskeletal:         General: No swelling or deformity. Normal range of motion.      Cervical back: Normal range  of motion and neck supple. No rigidity.      Right lower leg: No edema.      Left lower leg: No edema.      Comments: No calf tenderness    Skin:     General: Skin is warm and dry.      Capillary Refill: Capillary refill takes less than 2 seconds.      Coloration: Skin is not jaundiced.      Findings: No rash.   Neurological:      Mental Status: She is alert and oriented to person, place, and time.      Comments: Patient appreciated to have significant right-sided facial droop including the forehead.  She is unable to close the right eye.  She reports decreased sensation throughout the right face.  Findings most consistent with Bell's palsy.  Otherwise no focal neurologic deficit.    Last Known Well Time: 0624  Interval: Baseline  Time: 1950 PM  Person Administering Scale: Román Wade DO     1a  Level of consciousness: 0=alert; keenly responsive  1b. LOC questions:  0=Performs both tasks correctly  1c. LOC commands: 0=Performs both tasks correctly  2.  Best Gaze: 0=normal  3. Visual: 0=No visual loss  4. Facial Palsy: 3=Complete paralysis of one or both sides (absence of facial movement in the upper and lower face)  5a. Motor left arm: 0=No drift, limb holds 90 (or 45) degrees for full 10 seconds  5b.  Motor right arm: 0=No drift, limb holds 90 (or 45) degrees for full 10 seconds  6a. motor left le=No drift, limb holds 90 (or 45) degrees for full 10 seconds  6b  Motor right le=No drift, limb holds 90 (or 45) degrees for full 10 seconds  7. Limb Ataxia: 0=Absent  8.  Sensory: 1=Mild to moderate sensory loss; patient feels pinprick is less sharp or is dull on the affected side; there is a loss of superficial pain with pinprick but patient is aware She is being touched  9. Best Language:  0=No aphasia, normal  10. Dysarthria: 0=Normal  11. Extinction and Inattention: 0=No abnormality    Total:   4        VAN: VAN: Negative           Psychiatric:         Mood and Affect: Mood normal.          Behavior: Behavior normal.        No past medical history on file.   No past surgical history on file.   Social History     Socioeconomic History    Marital status:      Spouse name: Not on file    Number of children: Not on file    Years of education: Not on file    Highest education level: Not on file   Occupational History    Not on file   Tobacco Use    Smoking status: Not on file    Smokeless tobacco: Not on file   Substance and Sexual Activity    Alcohol use: Not on file    Drug use: Not on file    Sexual activity: Not on file   Other Topics Concern    Not on file   Social History Narrative    Not on file     Social Determinants of Health     Financial Resource Strain: Medium Risk (3/18/2024)    Received from [facility]    Overall Financial Resource Strain (CARDIA)     Difficulty of Paying Living Expenses: Somewhat hard   Food Insecurity: Food Insecurity Present (3/18/2024)    Received from [facility]    Hunger Vital Sign     Worried About Running Out of Food in the Last Year: Often true     Ran Out of Food in the Last Year: Sometimes true   Transportation Needs: Unknown (3/18/2024)    Received from [facility]    PRAPARE - Transportation     Lack of Transportation (Medical): Not on file     Lack of Transportation (Non-Medical): No   Physical Activity: Not on file   Stress: Not on file   Social Connections: Not on file   Intimate Partner Violence: Not on file   Housing Stability: Unknown (3/18/2024)    Received from [facility]    Housing Stability Vital Sign     Unable to Pay for Housing in the Last Year: Not on file     Number of Places Lived in the Last Year: Not on file     Unstable Housing in the Last Year: No      ------------------------------------------------------------------------------------------------------------------------------------------  CT head wo IV contrast   Final Result   No acute  intracranial hemorrhage or mass effect.             MACRO:   None.        Signed by: Madison Smart 6/26/2024 8:30 PM   Dictation workstation:   [id number]         Labs Reviewed   COMPREHENSIVE METABOLIC PANEL - Abnormal       Result Value    Glucose 111 (*)     Sodium 138      Potassium 3.8      Chloride 103      Bicarbonate 29      Anion Gap 10      Urea Nitrogen 20      Creatinine 0.87      eGFR 85      Calcium 9.4      Albumin 4.1      Alkaline Phosphatase 43      Total Protein 7.2      AST 18      Bilirubin, Total 0.4      ALT 17     POCT GLUCOSE - Abnormal    POCT Glucose 114 (*)    MAGNESIUM - Normal    Magnesium 1.92     TROPONIN I, HIGH SENSITIVITY - Normal    Troponin I, High Sensitivity 7      Narrative:     Less than 99th percentile of normal range cutoff-  Female and children under 18 years old <14 ng/L; Male <21 ng/L: Negative  Repeat testing should be performed if clinically indicated.     Female and children under 18 years old 14-50 ng/L; Male 21-50 ng/L:  Consistent with possible cardiac damage and possible increased clinical   risk. Serial measurements may help to assess extent of myocardial damage.     >50 ng/L: Consistent with cardiac damage, increased clinical risk and  myocardial infarction. Serial measurements may help assess extent of   myocardial damage.      NOTE: Children less than 1 year old may have higher baseline troponin   levels and results should be interpreted in conjunction with the overall   clinical context.     NOTE: Troponin I testing is performed using a different   testing methodology at [facility] than at other   [facility] hospitals. Direct result comparisons should only   be made within the same method.   HUMAN CHORIONIC GONADOTROPIN, SERUM QUANTITATIVE - Normal    HCG, Beta-Quantitative <2      Narrative:      Total HCG measurement is performed using the [employer]   Immunoassay which detects intact HCG and free beta HCG subunit.    This test is  not indicated for use as a tumor marker.   HCG testing is performed using a different test methodology at [facility] than other [facility] hospitals. Direct result comparison   should only be made within the same method.       CBC WITH AUTO DIFFERENTIAL    WBC 8.0      nRBC 0.0      RBC 4.48      Hemoglobin 12.0      Hematocrit 37.2      MCV 83      MCH 26.8      MCHC 32.3      RDW 14.5      Platelets 215      Neutrophils % 56.7      Immature Granulocytes %, Automated 0.2      Lymphocytes % 33.8      Monocytes % 6.7      Eosinophils % 1.7      Basophils % 0.9      Neutrophils Absolute 4.54      Immature Granulocytes Absolute, Automated 0.02      Lymphocytes Absolute 2.71      Monocytes Absolute 0.54      Eosinophils Absolute 0.14      Basophils Absolute 0.07     GRAY TOP    Extra Tube Hold for add-ons.          Medical Decision Making  EKG interpreted by ED physician: Normal sinus rhythm rate of 62.  KY, QRS within normal range.  QTc borderline prolonged at 503.  No significant ST elevations or depressions.  No significant Q waves.  Good R wave progression.  Normal axis.    44-year-old female presents emergency department with chief complaints of right-sided facial droop and decreased sensation.  Patient also reports watering from the right eye.  She reports last known normal sometime yesterday afternoon.  She is out of the window for TNK and clinically on exam stroke seems like a much less likely diagnosis.  Patient's clinical exam is consistent with Bell's palsy peripheral nerve as opposed to central nerve cause of her facial droop.  She has no other focal neurologic deficits on my exam.  A thorough workup was obtained given her presenting symptoms.  Patient did report headache yesterday with associated nausea and vomiting.  Head CT showed no acute acute hemorrhage or mass effect.  Patient denies any current headache on my exam here in the emergency department.  Patient does not have meningismus signs.   She reports her headache yesterday was a normal headache for her and therefore I have low suspicion for subarachnoid hemorrhage.  Pregnancy is negative.  CBC does not show significant leukocytosis or anemia.  Patient does not have findings of sepsis.  Cardiac enzyme and EKG show no acute ACS.  CMP shows no significant metabolic abnormalities.  Point-of-care glucose obtained in triage within normal range.  Patient is appreciated have elevated blood pressure here in the emergency department, but it does improve without intervention.  Advised patient of her elevated blood pressure and recommended she follow-up with her primary care doctor regarding this.  I discussed with patient that I suspect her symptoms are due to Bell's palsy.  She is given prednisone as well as Valtrex here and for home.  Patient also given symptomatic treatment for her eye which does not close completely she is given erythromycin ointment for night and artificial tears.  Advised patient follow-up with both her primary care doctor and ophthalmology.  She is comfortable returning home.  We discussed reasons to return to the ED.       Diagnoses as of 06/26/24 2327   Bell's palsy      1. Bell's palsy  predniSONE (Deltasone) 20 mg tablet    valACYclovir (Valtrex) 1 gram tablet    dextran 70-hypromellose (Bion Tears) 0.1-0.3 % ophthalmic solution    erythromycin (Romycin) 5 mg/gram (0.5 %) ophthalmic ointment         Procedures     This note was dictated using dragon software and may contain errors related to dictation interpretation errors.      Román Wade, DO  06/26/24 [id number]

## 2024-06-27 NOTE — DISCHARGE INSTRUCTIONS
Follow-up with your primary care doctor.  Return to the emergency department if symptoms worsen or change.  Follow-up with your ophthalmologist in the next week.  Take medications as prescribed.

## 2024-06-29 LAB
ATRIAL RATE: 62 BPM
P AXIS: 67 DEGREES
P OFFSET: 187 MS
P ONSET: 126 MS
PR INTERVAL: 182 MS
Q ONSET: 217 MS
QRS COUNT: 11 BEATS
QRS DURATION: 98 MS
QT INTERVAL: 496 MS
QTC CALCULATION(BAZETT): 503 MS
QTC FREDERICIA: 501 MS
R AXIS: 10 DEGREES
T AXIS: 71 DEGREES
T OFFSET: 465 MS
VENTRICULAR RATE: 62 BPM

## 2024-07-15 ENCOUNTER — OFFICE VISIT (OUTPATIENT)
Dept: FAMILY MEDICINE CLINIC | Age: 44
End: 2024-07-15
Payer: COMMERCIAL

## 2024-07-15 VITALS
TEMPERATURE: 97.5 F | OXYGEN SATURATION: 99 % | BODY MASS INDEX: 36.7 KG/M2 | HEART RATE: 76 BPM | WEIGHT: 199.4 LBS | DIASTOLIC BLOOD PRESSURE: 92 MMHG | SYSTOLIC BLOOD PRESSURE: 142 MMHG | HEIGHT: 62 IN

## 2024-07-15 DIAGNOSIS — I10 HYPERTENSION, UNSPECIFIED TYPE: ICD-10-CM

## 2024-07-15 DIAGNOSIS — N92.6 IRREGULAR MENSTRUAL CYCLE: ICD-10-CM

## 2024-07-15 DIAGNOSIS — F41.9 ANXIETY: ICD-10-CM

## 2024-07-15 DIAGNOSIS — E55.9 VITAMIN D DEFICIENCY: ICD-10-CM

## 2024-07-15 DIAGNOSIS — E78.5 HYPERLIPIDEMIA, UNSPECIFIED HYPERLIPIDEMIA TYPE: ICD-10-CM

## 2024-07-15 DIAGNOSIS — I10 HYPERTENSION, UNSPECIFIED TYPE: Primary | ICD-10-CM

## 2024-07-15 LAB
ALBUMIN SERPL-MCNC: 4.2 G/DL (ref 3.5–4.6)
ALP SERPL-CCNC: 51 U/L (ref 40–130)
ALT SERPL-CCNC: 16 U/L (ref 0–33)
ANION GAP SERPL CALCULATED.3IONS-SCNC: 12 MEQ/L (ref 9–15)
AST SERPL-CCNC: 14 U/L (ref 0–35)
BASOPHILS # BLD: 0 K/UL (ref 0–0.2)
BASOPHILS NFR BLD: 0.5 %
BILIRUB SERPL-MCNC: 0.3 MG/DL (ref 0.2–0.7)
BUN SERPL-MCNC: 11 MG/DL (ref 6–20)
CALCIUM SERPL-MCNC: 8.8 MG/DL (ref 8.5–9.9)
CHLORIDE SERPL-SCNC: 103 MEQ/L (ref 95–107)
CHOLEST SERPL-MCNC: 194 MG/DL (ref 0–199)
CO2 SERPL-SCNC: 25 MEQ/L (ref 20–31)
CREAT SERPL-MCNC: 0.72 MG/DL (ref 0.5–0.9)
EOSINOPHIL # BLD: 0.1 K/UL (ref 0–0.7)
EOSINOPHIL NFR BLD: 1.2 %
ERYTHROCYTE [DISTWIDTH] IN BLOOD BY AUTOMATED COUNT: 14.8 % (ref 11.5–14.5)
GLOBULIN SER CALC-MCNC: 3.1 G/DL (ref 2.3–3.5)
GLUCOSE SERPL-MCNC: 110 MG/DL (ref 70–99)
HCT VFR BLD AUTO: 37.9 % (ref 37–47)
HDLC SERPL-MCNC: 39 MG/DL (ref 40–59)
HGB BLD-MCNC: 12.4 G/DL (ref 12–16)
LDL CHOLESTEROL: 137 MG/DL (ref 0–129)
LYMPHOCYTES # BLD: 1.7 K/UL (ref 1–4.8)
LYMPHOCYTES NFR BLD: 22.5 %
MCH RBC QN AUTO: 26.8 PG (ref 27–31.3)
MCHC RBC AUTO-ENTMCNC: 32.7 % (ref 33–37)
MCV RBC AUTO: 82 FL (ref 79.4–94.8)
MONOCYTES # BLD: 0.4 K/UL (ref 0.2–0.8)
MONOCYTES NFR BLD: 5.9 %
NEUTROPHILS # BLD: 5.2 K/UL (ref 1.4–6.5)
NEUTS SEG NFR BLD: 69.6 %
PLATELET # BLD AUTO: 235 K/UL (ref 130–400)
POTASSIUM SERPL-SCNC: 3.9 MEQ/L (ref 3.4–4.9)
PROLACTIN SERPL-MCNC: 12.3 NG/ML
PROT SERPL-MCNC: 7.3 G/DL (ref 6.3–8)
RBC # BLD AUTO: 4.62 M/UL (ref 4.2–5.4)
SODIUM SERPL-SCNC: 140 MEQ/L (ref 135–144)
TRIGLYCERIDE, FASTING: 88 MG/DL (ref 0–150)
TSH REFLEX: 1.1 UIU/ML (ref 0.44–3.86)
WBC # BLD AUTO: 7.5 K/UL (ref 4.8–10.8)

## 2024-07-15 PROCEDURE — 99214 OFFICE O/P EST MOD 30 MIN: CPT | Performed by: FAMILY MEDICINE

## 2024-07-15 PROCEDURE — 3077F SYST BP >= 140 MM HG: CPT | Performed by: FAMILY MEDICINE

## 2024-07-15 PROCEDURE — 3080F DIAST BP >= 90 MM HG: CPT | Performed by: FAMILY MEDICINE

## 2024-07-15 RX ORDER — LISINOPRIL 20 MG/1
20 TABLET ORAL DAILY
Qty: 30 TABLET | Refills: 5 | Status: SHIPPED | OUTPATIENT
Start: 2024-07-15 | End: 2024-07-18 | Stop reason: SINTOL

## 2024-07-15 NOTE — PROGRESS NOTES
pedunculated papules under the breast and axilla all on a thin stalk     Labs   No components found for: \"TSHREFLEX\"  TSH   Date Value Ref Range Status   03/04/2016 1.530 0.270 - 4.200 uIU/mL Final     Lab Results   Component Value Date     08/09/2022    K 3.7 08/09/2022     08/09/2022    CO2 27 08/09/2022    BUN 13 08/09/2022    CREATININE 0.72 08/09/2022    GLUCOSE 104 (H) 08/09/2022    CALCIUM 9.3 08/09/2022    BILITOT 0.5 08/09/2022    ALKPHOS 58 08/09/2022    AST 29 08/09/2022    ALT 39 (H) 08/09/2022    LABGLOM >60.0 08/09/2022    GFRAA >60.0 08/09/2022    GLOB 3.5 08/09/2022         Lab Results   Component Value Date    LABA1C 5.6 03/04/2016     No results found for: \"EAG\"  Lab Results   Component Value Date    WBC 11.5 (H) 03/04/2016    HGB 14.7 03/04/2016    HCT 44.5 03/04/2016    MCV 87.1 03/04/2016     03/04/2016           A/P: Debbie Lozada 44 y.o. female presenting for     1. Hypertension, unspecified type  Elevated blood pressure in the clinic and at home since having a possible Bell's palsy.  Will start lisinopril.  Patient to take it 20 mg daily.  Measure blood pressure 1.5 to 2 hours after taking blood pressure medication.  - lisinopril (PRINIVIL;ZESTRIL) 20 MG tablet; Take 1 tablet by mouth daily  Dispense: 30 tablet; Refill: 5  - Lipid, Fasting; Future  - Comprehensive Metabolic Panel; Future  - CBC with Auto Differential; Future    2. Hyperlipidemia, unspecified hyperlipidemia type    - Lipid, Fasting; Future    3. Vitamin D deficiency  Check vitamin D     4. Irregular menstrual cycle    - Follicle Stimulating Hormone; Future  - Luteinizing Hormone; Future  - TSH with Reflex; Future  - Estrogens, Fractionated; Future  - Prolactin; Future  - C.trachomatis N.gonorrhoeae DNA, Urine; Future  - Trichomonas Vaginalis Rna, Qual Tma, Pap Via; Future    5. Anxiety  Hold off on any medication at this time.  Will see what blood work shows intake and next steps.  If patient is

## 2024-07-16 LAB
FOLLICLE STIMULATING HORMONE: 7.7 MIU/ML
LH: 13.2 MIU/ML (ref 1.7–8.6)

## 2024-07-17 LAB
C TRACH DNA UR QL NAA+PROBE: NEGATIVE
N GONORRHOEA DNA UR QL NAA+PROBE: NEGATIVE
SPECIMEN SOURCE: NORMAL
T VAGINALIS RRNA SPEC QL NAA+PROBE: NEGATIVE

## 2024-07-18 DIAGNOSIS — I10 HYPERTENSION, UNSPECIFIED TYPE: Primary | ICD-10-CM

## 2024-07-18 LAB
ESTRADIOL SERPL HS-MCNC: 157 PG/ML
ESTROGEN SERPL CALC-MCNC: 212.3 PG/ML
ESTRONE SERPL-MCNC: 55.3 PG/ML

## 2024-07-18 RX ORDER — LOSARTAN POTASSIUM 50 MG/1
50 TABLET ORAL DAILY
Qty: 30 TABLET | Refills: 5 | Status: SHIPPED | OUTPATIENT
Start: 2024-07-18

## 2024-07-22 ENCOUNTER — HOSPITAL ENCOUNTER (OUTPATIENT)
Dept: ULTRASOUND IMAGING | Age: 44
Discharge: HOME | End: 2024-07-24
Attending: FAMILY MEDICINE
Payer: COMMERCIAL

## 2024-07-22 ENCOUNTER — HOSPITAL ENCOUNTER (OUTPATIENT)
Dept: WOMENS IMAGING | Age: 44
Discharge: HOME | End: 2024-07-24
Attending: FAMILY MEDICINE
Payer: COMMERCIAL

## 2024-07-22 DIAGNOSIS — Z12.31 ENCOUNTER FOR SCREENING MAMMOGRAM FOR MALIGNANT NEOPLASM OF BREAST: ICD-10-CM

## 2024-07-22 DIAGNOSIS — N92.6 IRREGULAR MENSTRUAL CYCLE: ICD-10-CM

## 2024-07-22 PROCEDURE — 76830 TRANSVAGINAL US NON-OB: CPT

## 2024-07-22 PROCEDURE — 76856 US EXAM PELVIC COMPLETE: CPT

## 2024-07-22 PROCEDURE — 77063 BREAST TOMOSYNTHESIS BI: CPT

## 2024-07-23 DIAGNOSIS — F41.9 ANXIETY: Primary | ICD-10-CM

## 2024-07-23 DIAGNOSIS — N95.1 PERIMENOPAUSAL VASOMOTOR SYMPTOMS: ICD-10-CM

## 2024-07-23 RX ORDER — VENLAFAXINE HYDROCHLORIDE 37.5 MG/1
37.5 CAPSULE, EXTENDED RELEASE ORAL DAILY
Qty: 30 CAPSULE | Refills: 3 | Status: SHIPPED | OUTPATIENT
Start: 2024-07-23

## 2024-08-14 ENCOUNTER — OFFICE VISIT (OUTPATIENT)
Dept: FAMILY MEDICINE CLINIC | Age: 44
End: 2024-08-14
Payer: COMMERCIAL

## 2024-08-14 VITALS
HEIGHT: 62 IN | TEMPERATURE: 97.7 F | BODY MASS INDEX: 36.44 KG/M2 | OXYGEN SATURATION: 98 % | SYSTOLIC BLOOD PRESSURE: 134 MMHG | WEIGHT: 198 LBS | HEART RATE: 81 BPM | DIASTOLIC BLOOD PRESSURE: 86 MMHG

## 2024-08-14 DIAGNOSIS — I10 HYPERTENSION, UNSPECIFIED TYPE: ICD-10-CM

## 2024-08-14 DIAGNOSIS — F41.9 ANXIETY: Primary | ICD-10-CM

## 2024-08-14 DIAGNOSIS — F43.21 GRIEF: ICD-10-CM

## 2024-08-14 PROCEDURE — 3075F SYST BP GE 130 - 139MM HG: CPT | Performed by: FAMILY MEDICINE

## 2024-08-14 PROCEDURE — 99214 OFFICE O/P EST MOD 30 MIN: CPT | Performed by: FAMILY MEDICINE

## 2024-08-14 PROCEDURE — 3079F DIAST BP 80-89 MM HG: CPT | Performed by: FAMILY MEDICINE

## 2024-08-14 RX ORDER — LOSARTAN POTASSIUM 100 MG/1
100 TABLET ORAL DAILY
Qty: 30 TABLET | Refills: 1 | Status: SHIPPED | OUTPATIENT
Start: 2024-08-14

## 2024-08-14 RX ORDER — HYDROXYZINE HYDROCHLORIDE 25 MG/1
25 TABLET, FILM COATED ORAL EVERY 12 HOURS PRN
Qty: 30 TABLET | Refills: 0 | Status: SHIPPED | OUTPATIENT
Start: 2024-08-14 | End: 2024-09-13

## 2024-08-14 RX ORDER — VENLAFAXINE HYDROCHLORIDE 75 MG/1
75 CAPSULE, EXTENDED RELEASE ORAL DAILY
Qty: 30 CAPSULE | Refills: 1 | Status: SHIPPED | OUTPATIENT
Start: 2024-08-14